# Patient Record
Sex: FEMALE | Race: WHITE | HISPANIC OR LATINO | Employment: UNEMPLOYED | URBAN - METROPOLITAN AREA
[De-identification: names, ages, dates, MRNs, and addresses within clinical notes are randomized per-mention and may not be internally consistent; named-entity substitution may affect disease eponyms.]

---

## 2021-04-29 ENCOUNTER — APPOINTMENT (EMERGENCY)
Dept: CT IMAGING | Facility: HOSPITAL | Age: 36
DRG: 720 | End: 2021-04-29
Payer: COMMERCIAL

## 2021-04-29 ENCOUNTER — HOSPITAL ENCOUNTER (INPATIENT)
Facility: HOSPITAL | Age: 36
LOS: 3 days | Discharge: HOME | DRG: 720 | End: 2021-05-02
Attending: EMERGENCY MEDICINE | Admitting: INTERNAL MEDICINE
Payer: COMMERCIAL

## 2021-04-29 DIAGNOSIS — N61.1 ABSCESS OF RIGHT BREAST: ICD-10-CM

## 2021-04-29 DIAGNOSIS — E11.65 UNCONTROLLED TYPE 2 DIABETES MELLITUS WITH HYPERGLYCEMIA (HCC): ICD-10-CM

## 2021-04-29 DIAGNOSIS — E11.9 DM (DIABETES MELLITUS) (HCC): ICD-10-CM

## 2021-04-29 DIAGNOSIS — N61.1 BREAST ABSCESS: Primary | ICD-10-CM

## 2021-04-29 PROBLEM — E87.1 HYPONATREMIA: Status: ACTIVE | Noted: 2021-04-29

## 2021-04-29 PROBLEM — K02.9 DENTAL CARIES: Status: ACTIVE | Noted: 2021-04-29

## 2021-04-29 PROBLEM — E66.01 MORBID OBESITY (HCC): Status: ACTIVE | Noted: 2021-04-29

## 2021-04-29 LAB
ALBUMIN SERPL BCP-MCNC: 3 G/DL (ref 3.5–5)
ALP SERPL-CCNC: 120 U/L (ref 46–116)
ALT SERPL W P-5'-P-CCNC: 15 U/L (ref 12–78)
ANION GAP SERPL CALCULATED.3IONS-SCNC: 8 MMOL/L (ref 4–13)
AST SERPL W P-5'-P-CCNC: 8 U/L (ref 5–45)
BASOPHILS # BLD AUTO: 0.03 THOUSANDS/ΜL (ref 0–0.1)
BASOPHILS NFR BLD AUTO: 0 % (ref 0–1)
BILIRUB SERPL-MCNC: 0.37 MG/DL (ref 0.2–1)
BUN SERPL-MCNC: 11 MG/DL (ref 5–25)
CALCIUM ALBUM COR SERPL-MCNC: 10.5 MG/DL (ref 8.3–10.1)
CALCIUM SERPL-MCNC: 9.7 MG/DL (ref 8.3–10.1)
CHLORIDE SERPL-SCNC: 96 MMOL/L (ref 100–108)
CO2 SERPL-SCNC: 26 MMOL/L (ref 21–32)
CREAT SERPL-MCNC: 1.24 MG/DL (ref 0.6–1.3)
EOSINOPHIL # BLD AUTO: 0.08 THOUSAND/ΜL (ref 0–0.61)
EOSINOPHIL NFR BLD AUTO: 1 % (ref 0–6)
ERYTHROCYTE [DISTWIDTH] IN BLOOD BY AUTOMATED COUNT: 13.3 % (ref 11.6–15.1)
EST. AVERAGE GLUCOSE BLD GHB EST-MCNC: 292 MG/DL
GFR SERPL CREATININE-BSD FRML MDRD: 56 ML/MIN/1.73SQ M
GLUCOSE SERPL-MCNC: 287 MG/DL (ref 65–140)
GLUCOSE SERPL-MCNC: 365 MG/DL (ref 65–140)
GLUCOSE SERPL-MCNC: 475 MG/DL (ref 65–140)
HBA1C MFR BLD: 11.8 %
HCT VFR BLD AUTO: 41.5 % (ref 34.8–46.1)
HGB BLD-MCNC: 13.3 G/DL (ref 11.5–15.4)
IMM GRANULOCYTES # BLD AUTO: 0.12 THOUSAND/UL (ref 0–0.2)
IMM GRANULOCYTES NFR BLD AUTO: 1 % (ref 0–2)
LYMPHOCYTES # BLD AUTO: 1.74 THOUSANDS/ΜL (ref 0.6–4.47)
LYMPHOCYTES NFR BLD AUTO: 14 % (ref 14–44)
MCH RBC QN AUTO: 27.1 PG (ref 26.8–34.3)
MCHC RBC AUTO-ENTMCNC: 32 G/DL (ref 31.4–37.4)
MCV RBC AUTO: 85 FL (ref 82–98)
MONOCYTES # BLD AUTO: 0.47 THOUSAND/ΜL (ref 0.17–1.22)
MONOCYTES NFR BLD AUTO: 4 % (ref 4–12)
NEUTROPHILS # BLD AUTO: 9.94 THOUSANDS/ΜL (ref 1.85–7.62)
NEUTS SEG NFR BLD AUTO: 80 % (ref 43–75)
NRBC BLD AUTO-RTO: 0 /100 WBCS
PLATELET # BLD AUTO: 352 THOUSANDS/UL (ref 149–390)
PMV BLD AUTO: 8.9 FL (ref 8.9–12.7)
POTASSIUM SERPL-SCNC: 4.4 MMOL/L (ref 3.5–5.3)
PROT SERPL-MCNC: 8.7 G/DL (ref 6.4–8.2)
RBC # BLD AUTO: 4.9 MILLION/UL (ref 3.81–5.12)
SODIUM SERPL-SCNC: 130 MMOL/L (ref 136–145)
WBC # BLD AUTO: 12.38 THOUSAND/UL (ref 4.31–10.16)

## 2021-04-29 PROCEDURE — 85025 COMPLETE CBC W/AUTO DIFF WBC: CPT | Performed by: EMERGENCY MEDICINE

## 2021-04-29 PROCEDURE — 36415 COLL VENOUS BLD VENIPUNCTURE: CPT | Performed by: EMERGENCY MEDICINE

## 2021-04-29 PROCEDURE — G1004 CDSM NDSC: HCPCS

## 2021-04-29 PROCEDURE — 87070 CULTURE OTHR SPECIMN AEROBIC: CPT | Performed by: PHYSICIAN ASSISTANT

## 2021-04-29 PROCEDURE — 99285 EMERGENCY DEPT VISIT HI MDM: CPT

## 2021-04-29 PROCEDURE — 96361 HYDRATE IV INFUSION ADD-ON: CPT

## 2021-04-29 PROCEDURE — 96374 THER/PROPH/DIAG INJ IV PUSH: CPT

## 2021-04-29 PROCEDURE — NC001 PR NO CHARGE: Performed by: PHYSICIAN ASSISTANT

## 2021-04-29 PROCEDURE — 71260 CT THORAX DX C+: CPT

## 2021-04-29 PROCEDURE — 82948 REAGENT STRIP/BLOOD GLUCOSE: CPT

## 2021-04-29 PROCEDURE — 83036 HEMOGLOBIN GLYCOSYLATED A1C: CPT | Performed by: PHYSICIAN ASSISTANT

## 2021-04-29 PROCEDURE — 87075 CULTR BACTERIA EXCEPT BLOOD: CPT | Performed by: PHYSICIAN ASSISTANT

## 2021-04-29 PROCEDURE — 99223 1ST HOSP IP/OBS HIGH 75: CPT | Performed by: INTERNAL MEDICINE

## 2021-04-29 PROCEDURE — 99284 EMERGENCY DEPT VISIT MOD MDM: CPT | Performed by: EMERGENCY MEDICINE

## 2021-04-29 PROCEDURE — 80053 COMPREHEN METABOLIC PANEL: CPT | Performed by: EMERGENCY MEDICINE

## 2021-04-29 PROCEDURE — 99254 IP/OBS CNSLTJ NEW/EST MOD 60: CPT | Performed by: PHYSICIAN ASSISTANT

## 2021-04-29 PROCEDURE — 0H95XZZ DRAINAGE OF CHEST SKIN, EXTERNAL APPROACH: ICD-10-PCS | Performed by: EMERGENCY MEDICINE

## 2021-04-29 PROCEDURE — 87186 SC STD MICRODIL/AGAR DIL: CPT | Performed by: PHYSICIAN ASSISTANT

## 2021-04-29 PROCEDURE — 10061 I&D ABSCESS COMP/MULTIPLE: CPT | Performed by: PHYSICIAN ASSISTANT

## 2021-04-29 RX ORDER — ONDANSETRON 2 MG/ML
4 INJECTION INTRAMUSCULAR; INTRAVENOUS EVERY 4 HOURS PRN
Status: DISCONTINUED | OUTPATIENT
Start: 2021-04-29 | End: 2021-05-03 | Stop reason: HOSPADM

## 2021-04-29 RX ORDER — MORPHINE SULFATE 10 MG/ML
6 INJECTION, SOLUTION INTRAMUSCULAR; INTRAVENOUS ONCE
Status: COMPLETED | OUTPATIENT
Start: 2021-04-29 | End: 2021-04-29

## 2021-04-29 RX ORDER — SODIUM CHLORIDE 9 MG/ML
125 INJECTION, SOLUTION INTRAVENOUS CONTINUOUS
Status: DISPENSED | OUTPATIENT
Start: 2021-04-29 | End: 2021-04-30

## 2021-04-29 RX ORDER — LIDOCAINE HYDROCHLORIDE 10 MG/ML
10 INJECTION, SOLUTION EPIDURAL; INFILTRATION; INTRACAUDAL; PERINEURAL ONCE
Status: COMPLETED | OUTPATIENT
Start: 2021-04-29 | End: 2021-04-29

## 2021-04-29 RX ORDER — OXYCODONE HYDROCHLORIDE 5 MG/1
5 TABLET ORAL EVERY 4 HOURS PRN
Status: DISCONTINUED | OUTPATIENT
Start: 2021-04-29 | End: 2021-05-03 | Stop reason: HOSPADM

## 2021-04-29 RX ORDER — ACETAMINOPHEN 325 MG/1
650 TABLET ORAL EVERY 6 HOURS PRN
Status: DISCONTINUED | OUTPATIENT
Start: 2021-04-29 | End: 2021-05-03 | Stop reason: HOSPADM

## 2021-04-29 RX ADMIN — LIDOCAINE HYDROCHLORIDE 10 ML: 10 INJECTION, SOLUTION EPIDURAL; INFILTRATION; INTRACAUDAL; PERINEURAL at 14:52

## 2021-04-29 RX ADMIN — SODIUM CHLORIDE 125 ML/HR: 0.9 INJECTION, SOLUTION INTRAVENOUS at 18:29

## 2021-04-29 RX ADMIN — INSULIN HUMAN 10 UNITS: 100 INJECTION, SOLUTION PARENTERAL at 18:41

## 2021-04-29 RX ADMIN — INSULIN LISPRO 6 UNITS: 100 INJECTION, SOLUTION INTRAVENOUS; SUBCUTANEOUS at 21:46

## 2021-04-29 RX ADMIN — INSULIN DETEMIR 25 UNITS: 100 INJECTION, SOLUTION SUBCUTANEOUS at 22:12

## 2021-04-29 RX ADMIN — PIPERACILLIN AND TAZOBACTAM 3.38 G: 36; 4.5 INJECTION, POWDER, FOR SOLUTION INTRAVENOUS at 16:16

## 2021-04-29 RX ADMIN — SODIUM CHLORIDE 1000 ML: 0.9 INJECTION, SOLUTION INTRAVENOUS at 13:46

## 2021-04-29 RX ADMIN — ENOXAPARIN SODIUM 40 MG: 40 INJECTION SUBCUTANEOUS at 18:29

## 2021-04-29 RX ADMIN — IOHEXOL 100 ML: 350 INJECTION, SOLUTION INTRAVENOUS at 14:31

## 2021-04-29 RX ADMIN — MORPHINE SULFATE 6 MG: 10 INJECTION INTRAVENOUS at 13:46

## 2021-04-29 NOTE — PROCEDURES
Incision and drain    Date/Time: 4/29/2021 6:06 PM  Performed by: Branden Leyva PA-C  Authorized by: Branden Leyva PA-C   Universal Protocol:  Consent: Verbal consent obtained  Risks and benefits: risks, benefits and alternatives were discussed  Consent given by: patient and parent  Time out: Immediately prior to procedure a "time out" was called to verify the correct patient, procedure, equipment, support staff and site/side marked as required  Timeout called at: 4/29/2021 3:15 PM   Patient identity confirmed: verbally with patient      Patient location:  ED  Location:     Type:  Abscess    Location:  Trunk  Anesthesia (see MAR for exact dosages): Anesthesia method:  Local infiltration    Local anesthetic:  Lidocaine 1% w/o epi  Procedure details:     Complexity:  Complex    Needle aspiration: no      Incision types:  Elliptical    Scalpel blade:  11    Approach:  Open    Incision depth:  Dermal    Wound management:  Probed and deloculated    Drainage:  Purulent    Drainage amount:  Copious    Wound treatment:  Packing placed    Packing materials:  1/2 in gauze    Amount 1/2":  4 inches   Post-procedure details:     Patient tolerance of procedure: Tolerated well, no immediate complications    Complication (if applicable):  Cultures obtained

## 2021-04-29 NOTE — ASSESSMENT & PLAN NOTE
· Breast abscess in the setting of hyperglycemia/uncontrolled diabetes  · Status post I&D by surgery in the ED today    Send culture stat and start unasyn; tailor antibiotics based on culture data

## 2021-04-29 NOTE — ED PROVIDER NOTES
History  Chief Complaint   Patient presents with    Breast Problem     Patient c/o right breast redness and swelling, believes she has an abscess  Patient is a 75-year-old female past medical history of diabetes noncompliant with her medication presenting for right breast pain  Patient states the last 2-3 days she has had worsened swelling, pain which is stabbing in nature, intermittent, worse with pressure nonradiating to the right breast   She denies any drainage from the swelling, nipple drainage, fevers  She states she took Tylenol yesterday with no relief as well as trazodone yesterday with no relief, no medications today  She also has been trying hot compresses, once a day for the last 2-3 days with no relief  None       Past Medical History:   Diagnosis Date    [facility]        History reviewed  No pertinent surgical history  History reviewed  No pertinent family history  I have reviewed and agree with the history as documented  E-Cigarette/Vaping     E-Cigarette/Vaping Substances     Social History     Tobacco Use    Smoking status: Never Smoker    Smokeless tobacco: Never Used   Substance Use Topics    Alcohol use: Never     Frequency: Never    Drug use: Never       Review of Systems   All other systems reviewed and are negative  Physical Exam  Physical Exam  Vitals signs reviewed  Constitutional:       General: She is not in acute distress  Appearance: Normal appearance  She is not ill-appearing  HENT:      Mouth/Throat:      Mouth: Mucous membranes are moist    Eyes:      Conjunctiva/sclera: Conjunctivae normal    Neck:      Musculoskeletal: Neck supple  Cardiovascular:      Rate and Rhythm: Normal rate and regular rhythm  Heart sounds: Normal heart sounds  Pulmonary:      Effort: Pulmonary effort is normal       Breath sounds: Normal breath sounds        Comments: Patient has large roughly 8-9 cm in diameter very indurated erythematous collection with central head but no active drainage to the right breast, no nipple drainage, tender to palpation, little to no fluctuance appreciated  Abdominal:      General: Abdomen is flat  Palpations: Abdomen is soft  Tenderness: There is no abdominal tenderness  Musculoskeletal: Normal range of motion  General: No swelling  Skin:     General: Skin is warm and dry  Neurological:      General: No focal deficit present  Mental Status: She is alert     Psychiatric:         Mood and Affect: Mood normal          Vital Signs  ED Triage Vitals   Temperature Pulse Respirations Blood Pressure SpO2   04/29/21 1247 04/29/21 1247 04/29/21 1247 04/29/21 1247 04/29/21 1247   98 9 °F (37 2 °C) (!) 106 18 127/88 98 %      Temp src Heart Rate Source Patient Position - Orthostatic VS BP Location FiO2 (%)   -- 04/29/21 1247 04/29/21 1413 04/29/21 1413 --    Monitor Sitting Right arm       Pain Score       04/29/21 1413       No Pain           Vitals:    04/29/21 1247 04/29/21 1413 04/29/21 1818   BP: 127/88 143/88 136/98   Pulse: (!) 106 (!) 111 (!) 124   Patient Position - Orthostatic VS:  Sitting          Visual Acuity      ED Medications  Medications   sodium chloride 0 9 % infusion (125 mL/hr Intravenous New Bag 4/29/21 1829)   insulin lispro (HumaLOG) 100 units/mL subcutaneous injection 2-12 Units (has no administration in time range)   insulin detemir (LEVEMIR) subcutaneous injection 25 Units (has no administration in time range)   enoxaparin (LOVENOX) subcutaneous injection 40 mg (40 mg Subcutaneous Given 4/29/21 1829)   acetaminophen (TYLENOL) tablet 650 mg (has no administration in time range)   ondansetron (ZOFRAN) injection 4 mg (has no administration in time range)   oxyCODONE (ROXICODONE) IR tablet 5 mg (has no administration in time range)   insulin lispro (HumaLOG) 100 units/mL subcutaneous injection 8 Units (has no administration in time range)   ampicillin-sulbactam (UNASYN) 3 g in sodium chloride 0 9 % 100 mL IVPB (has no administration in time range)   morphine (PF) 10 mg/mL injection 6 mg (6 mg Intravenous Given 4/29/21 1346)   sodium chloride 0 9 % bolus 1,000 mL (0 mL Intravenous Stopped 4/29/21 1719)   iohexol (OMNIPAQUE) 350 MG/ML injection (MULTI-DOSE) 100 mL (100 mL Intravenous Given 4/29/21 1431)   lidocaine (PF) (XYLOCAINE-MPF) 1 % injection 10 mL (10 mL Infiltration Given by Other 4/29/21 1452)   piperacillin-tazobactam (ZOSYN) 3 375 g in sodium chloride 0 9 % 100 mL IVPB (0 g Intravenous Stopped 4/29/21 1719)   insulin regular (HumuLIN R,NovoLIN R) injection 10 Units (10 Units Intravenous Given 4/29/21 1841)       Diagnostic Studies  Results Reviewed     Procedure Component Value Units Date/Time    Hemoglobin A1C [[id number]]  (Abnormal) Collected: 04/29/21 1346    Lab Status: Final result Specimen: Blood from Arm, Left Updated: 04/29/21 2013     Hemoglobin A1C 11 8 %       mg/dl     Anaerobic culture and Gram stain [[id number]] Collected: 04/29/21 1907    Lab Status: In process Specimen: Body Fluid from Other Updated: 04/29/21 1930    Wound culture and Gram stain [[id number]] Collected: 04/29/21 1907    Lab Status: In process Specimen: Wound from Breast, Right Updated: 04/29/21 1930    Body fluid culture and Gram stain [[id number]] Collected: 04/29/21 1908    Lab Status: In process Specimen:  Body Fluid from Other Updated: 04/29/21 1930    POCT pregnancy, urine [[id number]]     Lab Status: No result     Comprehensive metabolic panel [[id number]]  (Abnormal) Collected: 04/29/21 1346    Lab Status: Final result Specimen: Blood from Arm, Left Updated: 04/29/21 1411     Sodium 130 mmol/L      Potassium 4 4 mmol/L      Chloride 96 mmol/L      CO2 26 mmol/L      ANION GAP 8 mmol/L      BUN 11 mg/dL      Creatinine 1 24 mg/dL      Glucose 475 mg/dL      Calcium 9 7 mg/dL      Corrected Calcium 10 5 mg/dL      AST 8 U/L      ALT 15 U/L      Alkaline Phosphatase 120 U/L      Total Protein 8 7 g/dL      Albumin 3 0 g/dL      Total Bilirubin 0 37 mg/dL      eGFR 56 ml/min/1 73sq m     Narrative:      [place] guidelines for Chronic Kidney Disease (CKD):     Stage 1 with normal or high GFR (GFR > 90 mL/min/1 73 square meters)    Stage 2 Mild CKD (GFR = 60-89 mL/min/1 73 square meters)    Stage 3A Moderate CKD (GFR = 45-59 mL/min/1 73 square meters)    Stage 3B Moderate CKD (GFR = 30-44 mL/min/1 73 square meters)    Stage 4 Severe CKD (GFR = 15-29 mL/min/1 73 square meters)    Stage 5 End Stage CKD (GFR <15 mL/min/1 73 square meters)  Note: GFR calculation is accurate only with a steady state creatinine    CBC and differential [[id number]]  (Abnormal) Collected: 04/29/21 1346    Lab Status: Final result Specimen: Blood from Arm, Left Updated: 04/29/21 1356     WBC 12 38 Thousand/uL      RBC 4 90 Million/uL      Hemoglobin 13 3 g/dL      Hematocrit 41 5 %      MCV 85 fL      MCH 27 1 pg      MCHC 32 0 g/dL      RDW 13 3 %      MPV 8 9 fL      Platelets 147 Thousands/uL      nRBC 0 /100 WBCs      Neutrophils Relative 80 %      Immat GRANS % 1 %      Lymphocytes Relative 14 %      Monocytes Relative 4 %      Eosinophils Relative 1 %      Basophils Relative 0 %      Neutrophils Absolute 9 94 Thousands/µL      Immature Grans Absolute 0 12 Thousand/uL      Lymphocytes Absolute 1 74 Thousands/µL      Monocytes Absolute 0 47 Thousand/µL      Eosinophils Absolute 0 08 Thousand/µL      Basophils Absolute 0 03 Thousands/µL                  CT chest with contrast   Final Result by Jose Martin Saunders MD (04/29 6452)      Right breast cellulitis with superficial 7 3 cm abscess  Incidental findings of fatty liver and cholelithiasis               I personally discussed this study with Steve Burns on 4/29/2021 at 3:00 PM                   Workstation performed: [id number]                    Procedures  Procedures         ED Course  ED Course as of Apr 29 2116   Thu Apr 29, 2021 1513 Patient with 7 cm abscess and surrounding cellulitis, awaiting surgical consult  [id number] Surgery has performed I&D and recommends admission IV antibiotics  SBIRT 20yo+      Most Recent Value   SBIRT (22 yo +)   In order to provide better care to our patients, we are screening all of our patients for alcohol and drug use  Would it be okay to ask you these screening questions? Yes Filed at: 04/29/2021 1321   Initial Alcohol Screen: US AUDIT-C    1  How often do you have a drink containing alcohol?  0 Filed at: 04/29/2021 1321   2  How many drinks containing alcohol do you have on a typical day you are drinking? 0 Filed at: 04/29/2021 1321   3b  FEMALE Any Age, or MALE 65+: How often do you have 4 or more drinks on one occassion? 0 Filed at: 04/29/2021 1321   Audit-C Score  0 Filed at: 04/29/2021 1321   ARIEL: How many times in the past year have you    Used an illegal drug or used a prescription medication for non-medical reasons? Never Filed at: 04/29/2021 1321                    [facility]  Number of Diagnoses or Management Options  Diagnosis management comments: Patient is a 38-year-old female presenting with breast abscess  Patient is well-appearing bedside stable vitals and in no acute distress  She is very large indurated erythematous breast abscess with no drainage from central head  Will obtain labs and CT as breast ultrasonography is not available and discuss with general surgery versus breast surgery for drainage        Disposition  Final diagnoses:   Breast abscess     Time reflects when diagnosis was documented in both MDM as applicable and the Disposition within this note     Time User Action Codes Description Comment    4/29/2021  4:05 PM Loni Alfaro Add [N61 1] Breast abscess     4/29/2021  6:14 PM Linda VILLAR Add [N61 1] Abscess of right breast       ED Disposition     ED Disposition Condition Date/Time Comment    Admit Stable Thu Apr 29, 2021  4:05 PM Case was discussed with Dr Julianna Barbosa and the patient's admission status was agreed to be Admission Status: inpatient status to the service of Dr Julianna Barbosa   Follow-up Information    None         There are no discharge medications for this patient  No discharge procedures on file      PDMP Review     None          ED Provider  Electronically Signed by           Sonal Herron DO  04/29/21 6401

## 2021-04-29 NOTE — ASSESSMENT & PLAN NOTE
No results found for: HGBA1C    No results for input(s): POCGLU in the last 72 hours  · Previously on oral medications years ago but has been lost to follow-up  · Follow-up on A1c    Give 10 units IV insulin x1 and start levemir with sliding scale insulin

## 2021-04-29 NOTE — ASSESSMENT & PLAN NOTE
· Very poor dentition with risk for dental abscess given hyperglycemia  · Advised follow-up with a dentist after discharge
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
## 2021-04-29 NOTE — PLAN OF CARE
Problem: PAIN - ADULT  Goal: Verbalizes/displays adequate comfort level or baseline comfort level  Description: Interventions:  - Encourage patient to monitor pain and request assistance  - Assess pain using appropriate pain scale  - Administer analgesics based on type and severity of pain and evaluate response  - Implement non-pharmacological measures as appropriate and evaluate response  - Consider cultural and social influences on pain and pain management  - Notify physician/advanced practitioner if interventions unsuccessful or patient reports new pain  Outcome: Progressing     Problem: INFECTION - ADULT  Goal: Absence or prevention of progression during hospitalization  Description: INTERVENTIONS:  - Assess and monitor for signs and symptoms of infection  - Monitor lab/diagnostic results  - Monitor all insertion sites, i e  indwelling lines, tubes, and drains  - Monitor endotracheal if appropriate and nasal secretions for changes in amount and color  - [place] appropriate cooling/warming therapies per order  - Administer medications as ordered  - Instruct and encourage patient and family to use good hand hygiene technique  - Identify and instruct in appropriate isolation precautions for identified infection/condition  Outcome: Progressing  Goal: Absence of fever/infection during neutropenic period  Description: INTERVENTIONS:  - Monitor WBC    Outcome: Progressing     Problem: SAFETY ADULT  Goal: Patient will remain free of falls  Description: INTERVENTIONS:  - Assess patient frequently for physical needs  -  Identify cognitive and physical deficits and behaviors that affect risk of falls    -  [place] fall precautions as indicated by assessment   - Educate patient/family on patient safety including physical limitations  - Instruct patient to call for assistance with activity based on assessment  - Modify environment to reduce risk of injury  - Consider OT/PT consult to assist with strengthening/mobility  Outcome: Progressing  Goal: Maintain or return to baseline ADL function  Description: INTERVENTIONS:  -  Assess patient's ability to carry out ADLs; assess patient's baseline for ADL function and identify physical deficits which impact ability to perform ADLs (bathing, care of mouth/teeth, toileting, grooming, dressing, etc )  - Assess/evaluate cause of self-care deficits   - Assess range of motion  - Assess patient's mobility; develop plan if impaired  - Assess patient's need for assistive devices and provide as appropriate  - Encourage maximum independence but intervene and supervise when necessary  - Involve family in performance of ADLs  - Assess for home care needs following discharge   - Consider OT consult to assist with ADL evaluation and planning for discharge  - Provide patient education as appropriate  Outcome: Progressing  Goal: Maintain or return mobility status to optimal level  Description: INTERVENTIONS:  - Assess patient's baseline mobility status (ambulation, transfers, stairs, etc )    - Identify cognitive and physical deficits and behaviors that affect mobility  - Identify mobility aids required to assist with transfers and/or ambulation (gait belt, sit-to-stand, lift, walker, cane, etc )  - [place] fall precautions as indicated by assessment  - Record patient progress and toleration of activity level on Mobility SBAR; progress patient to next Phase/Stage  - Instruct patient to call for assistance with activity based on assessment  - Consider rehabilitation consult to assist with strengthening/weightbearing, etc   Outcome: Progressing     Problem: DISCHARGE PLANNING  Goal: Discharge to home or other facility with appropriate resources  Description: INTERVENTIONS:  - Identify barriers to discharge w/patient and caregiver  - Arrange for needed discharge resources and transportation as appropriate  - Identify discharge learning needs (meds, wound care, etc )  - Arrange for interpretive services to assist at discharge as needed  - Refer to Case Management Department for coordinating discharge planning if the patient needs post-hospital services based on physician/advanced practitioner order or complex needs related to functional status, cognitive ability, or social support system  Outcome: Progressing     Problem: Knowledge Deficit  Goal: Patient/family/caregiver demonstrates understanding of disease process, treatment plan, medications, and discharge instructions  Description: Complete learning assessment and assess knowledge base  Interventions:  - Provide teaching at level of understanding  - Provide teaching via preferred learning methods  Outcome: Progressing     Problem: Nutrition/Hydration-ADULT  Goal: Nutrient/Hydration intake appropriate for improving, restoring or maintaining nutritional needs  Description: Monitor and assess patient's nutrition/hydration status for malnutrition  Collaborate with interdisciplinary team and initiate plan and interventions as ordered  Monitor patient's weight and dietary intake as ordered or per policy  Utilize nutrition screening tool and intervene as necessary  Determine patient's food preferences and provide high-protein, high-caloric foods as appropriate       INTERVENTIONS:  - Monitor oral intake, urinary output, labs, and treatment plans  - Assess nutrition and hydration status and recommend course of action  - Evaluate amount of meals eaten  - Assist patient with eating if necessary   - Allow adequate time for meals  - Recommend/ encourage appropriate diets, oral nutritional supplements, and vitamin/mineral supplements  - Order, calculate, and assess calorie counts as needed  - Recommend, monitor, and adjust tube feedings and TPN/PPN based on assessed needs  - Assess need for intravenous fluids  - Provide specific nutrition/hydration education as appropriate  - Include patient/family/caregiver in decisions related to nutrition  Outcome: Progressing
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
## 2021-04-29 NOTE — ASSESSMENT & PLAN NOTE
· Secondary to hyperglycemia  · Continue NSS and recheck in a m      Results from last 7 days   Lab Units 04/29/21  1346   SODIUM mmol/L 130*

## 2021-04-30 LAB
ALBUMIN SERPL BCP-MCNC: 2.3 G/DL (ref 3.5–5)
ALP SERPL-CCNC: 90 U/L (ref 46–116)
ALT SERPL W P-5'-P-CCNC: 10 U/L (ref 12–78)
ANION GAP SERPL CALCULATED.3IONS-SCNC: 7 MMOL/L (ref 4–13)
AST SERPL W P-5'-P-CCNC: 8 U/L (ref 5–45)
BILIRUB SERPL-MCNC: 0.26 MG/DL (ref 0.2–1)
BUN SERPL-MCNC: 9 MG/DL (ref 5–25)
CALCIUM ALBUM COR SERPL-MCNC: 10 MG/DL (ref 8.3–10.1)
CALCIUM SERPL-MCNC: 8.6 MG/DL (ref 8.3–10.1)
CHLORIDE SERPL-SCNC: 101 MMOL/L (ref 100–108)
CO2 SERPL-SCNC: 23 MMOL/L (ref 21–32)
CREAT SERPL-MCNC: 0.92 MG/DL (ref 0.6–1.3)
ERYTHROCYTE [DISTWIDTH] IN BLOOD BY AUTOMATED COUNT: 13.3 % (ref 11.6–15.1)
GFR SERPL CREATININE-BSD FRML MDRD: 80 ML/MIN/1.73SQ M
GLUCOSE SERPL-MCNC: 192 MG/DL (ref 65–140)
GLUCOSE SERPL-MCNC: 229 MG/DL (ref 65–140)
GLUCOSE SERPL-MCNC: 242 MG/DL (ref 65–140)
GLUCOSE SERPL-MCNC: 243 MG/DL (ref 65–140)
GLUCOSE SERPL-MCNC: 260 MG/DL (ref 65–140)
GLUCOSE SERPL-MCNC: 296 MG/DL (ref 65–140)
HCT VFR BLD AUTO: 35.2 % (ref 34.8–46.1)
HGB BLD-MCNC: 11.3 G/DL (ref 11.5–15.4)
MCH RBC QN AUTO: 27.2 PG (ref 26.8–34.3)
MCHC RBC AUTO-ENTMCNC: 32.1 G/DL (ref 31.4–37.4)
MCV RBC AUTO: 85 FL (ref 82–98)
PLATELET # BLD AUTO: 277 THOUSANDS/UL (ref 149–390)
PMV BLD AUTO: 8.8 FL (ref 8.9–12.7)
POTASSIUM SERPL-SCNC: 4 MMOL/L (ref 3.5–5.3)
PROT SERPL-MCNC: 7 G/DL (ref 6.4–8.2)
RBC # BLD AUTO: 4.15 MILLION/UL (ref 3.81–5.12)
SODIUM SERPL-SCNC: 131 MMOL/L (ref 136–145)
WBC # BLD AUTO: 11.74 THOUSAND/UL (ref 4.31–10.16)

## 2021-04-30 PROCEDURE — 82948 REAGENT STRIP/BLOOD GLUCOSE: CPT

## 2021-04-30 PROCEDURE — 99024 POSTOP FOLLOW-UP VISIT: CPT | Performed by: SURGERY

## 2021-04-30 PROCEDURE — 99232 SBSQ HOSP IP/OBS MODERATE 35: CPT | Performed by: NURSE PRACTITIONER

## 2021-04-30 PROCEDURE — 80053 COMPREHEN METABOLIC PANEL: CPT | Performed by: INTERNAL MEDICINE

## 2021-04-30 PROCEDURE — 85027 COMPLETE CBC AUTOMATED: CPT | Performed by: INTERNAL MEDICINE

## 2021-04-30 RX ADMIN — INSULIN LISPRO 8 UNITS: 100 INJECTION, SOLUTION INTRAVENOUS; SUBCUTANEOUS at 11:22

## 2021-04-30 RX ADMIN — INSULIN LISPRO 8 UNITS: 100 INJECTION, SOLUTION INTRAVENOUS; SUBCUTANEOUS at 09:14

## 2021-04-30 RX ADMIN — INSULIN LISPRO 6 UNITS: 100 INJECTION, SOLUTION INTRAVENOUS; SUBCUTANEOUS at 11:21

## 2021-04-30 RX ADMIN — INSULIN LISPRO 2 UNITS: 100 INJECTION, SOLUTION INTRAVENOUS; SUBCUTANEOUS at 17:00

## 2021-04-30 RX ADMIN — INSULIN DETEMIR 25 UNITS: 100 INJECTION, SOLUTION SUBCUTANEOUS at 21:14

## 2021-04-30 RX ADMIN — AMPICILLIN SODIUM AND SULBACTAM SODIUM 3 G: 10; 5 INJECTION, POWDER, FOR SOLUTION INTRAVENOUS at 11:53

## 2021-04-30 RX ADMIN — INSULIN LISPRO 4 UNITS: 100 INJECTION, SOLUTION INTRAVENOUS; SUBCUTANEOUS at 09:13

## 2021-04-30 RX ADMIN — INSULIN LISPRO 8 UNITS: 100 INJECTION, SOLUTION INTRAVENOUS; SUBCUTANEOUS at 17:00

## 2021-04-30 RX ADMIN — AMPICILLIN SODIUM AND SULBACTAM SODIUM 3 G: 10; 5 INJECTION, POWDER, FOR SOLUTION INTRAVENOUS at 00:05

## 2021-04-30 RX ADMIN — INSULIN LISPRO 4 UNITS: 100 INJECTION, SOLUTION INTRAVENOUS; SUBCUTANEOUS at 21:14

## 2021-04-30 RX ADMIN — AMPICILLIN SODIUM AND SULBACTAM SODIUM 3 G: 10; 5 INJECTION, POWDER, FOR SOLUTION INTRAVENOUS at 18:07

## 2021-04-30 RX ADMIN — AMPICILLIN SODIUM AND SULBACTAM SODIUM 3 G: 10; 5 INJECTION, POWDER, FOR SOLUTION INTRAVENOUS at 23:46

## 2021-04-30 RX ADMIN — ENOXAPARIN SODIUM 40 MG: 40 INJECTION SUBCUTANEOUS at 09:12

## 2021-04-30 RX ADMIN — AMPICILLIN SODIUM AND SULBACTAM SODIUM 3 G: 10; 5 INJECTION, POWDER, FOR SOLUTION INTRAVENOUS at 06:04

## 2021-04-30 RX ADMIN — SODIUM CHLORIDE 125 ML/HR: 0.9 INJECTION, SOLUTION INTRAVENOUS at 03:28

## 2021-04-30 NOTE — ASSESSMENT & PLAN NOTE
Lab Results   Component Value Date    HGBA1C 11 8 (H) 04/29/2021       Recent Labs     04/29/21  1821 04/29/21  2043 04/30/21  0744   POCGLU 365* 287* 242*       · Previously on oral medications years ago but has been lost to follow-up  · The A1c 11 8  · Started on 25 units of Levemir nightly, received last night blood sugar still 240 this morning will consider increasing to 30 units tonight   · 8 units of Humalog with meals plus sliding scale  · Case management aware will need insulin and glucometer on discharge as well as endocrinology follow-up

## 2021-04-30 NOTE — CASE MANAGEMENT
[facility] unable to service due to census staffing  Referral sent to 40 mile radius await intake referral    [facility]  Discussed Freedom of Choice with patient  List of agencies given to patient via in person  patient aware the list is custom filtered for them by preference  and that [facility] post acute providers are designated

## 2021-04-30 NOTE — UTILIZATION REVIEW
Initial Clinical Review    Admission: Date/Time/Statement:   Admission Orders (From admission, onward)     Ordered        04/29/21 1606  Inpatient Admission  Once                   Orders Placed This Encounter   Procedures    Inpatient Admission     Standing Status:   Standing     Number of Occurrences:   1     Order Specific Question:   Level of Care     Answer:   Med Surg [16]     Order Specific Question:   Estimated length of stay     Answer:   More than 2 Midnights     Order Specific Question:   Certification     Answer:   I certify that inpatient services are medically necessary for this patient for a duration of greater than two midnights  See H&P and MD Progress Notes for additional information about the patient's course of treatment  ED Arrival Information     Expected Arrival Acuity Means of Arrival Escorted By Service Admission Type    - 4/29/2021 12:39 Less Urgent Walk-In Family Member General Medicine Urgent    Arrival Complaint    Abscess        Chief Complaint   Patient presents with    Breast Problem     Patient c/o right breast redness and swelling, believes she has an abscess  Initial Presentation: 38 y o  female with a past medical history of uncontrolled diabetes, hasn't taken metformin ordered x several yrs who presents to ED from home with worsening right breast pain and swelling over last three days  Imaging showed cellulitis and abscess R breast Labs show leukocytosis and elevated glucose, low sodium  On exam, pt tachycardic, has roughly 8-9 cm in diameter very indurated, erythematous collection with central head but no active drainage to the right breast, no nipple drainage, tender to palpation, little to no fluctuance appreciated  No relief from home Tylenol, trazadone or hot compresses  Pt has dental caries   Pt underwent I and D in ED by general surgery under local with cultures obtained  Wound probed and deloculated for copious purulent drainage   Wound packed with gauze and drsg applied  Pt admitted as Inpatient with abscess R breast and uncontrolled DM with hyperglycemia  Plan includes IV Zosyn, daily wound care packing, warm compresses, monitor labs, give 10 U IV insulinx1  And start Levemir with SSI and accucheks, IVF -NSS    Date: 4/30/21 Day 2:   General surgery- Cellulitis- large area of cellulitic erythema along lateral border slightly improved compared to marked margins, 3cm incision still with mild amount of purulent drainage, no further palpable areas of fluctuance    Plan to continue daily wound care and IV abx, f/u wound cultures to tailor abx, Aqua K pad over cellulitis  Medicine-OagE0k=05 8  Blood sugar still elevated today, consider increasing Levemir to 30 U at night, 8 U humalog with meals and SSI  Hyponatremia improving with IVF,will continue to improve with better glycemic control  WBC downtrending    ED Triage Vitals   Temperature Pulse Respirations Blood Pressure SpO2   04/29/21 1247 04/29/21 1247 04/29/21 1247 04/29/21 1247 04/29/21 1247   98 9 °F (37 2 °C) (!) 106 18 127/88 98 %      Temp Source Heart Rate Source Patient Position - Orthostatic VS BP Location FiO2 (%)   04/30/21 0749 04/29/21 1247 04/29/21 1413 04/29/21 1413 --   Oral Monitor Sitting Right arm       Pain Score       04/29/21 1413       No Pain          Wt Readings from Last 1 Encounters:   04/29/21 99 3 kg (219 lb)     Additional Vital Signs:   Date/Time  Temp  Pulse  Resp  BP  MAP (mmHg)  SpO2    04/30/21 14:51:13  99 4 °F (37 4 °C)  106Abnormal   17  123/80  94  97 %    04/30/21 1153  --  --  --  --  --  --    04/30/21 07:49:10  98 6 °F (37 °C)  98  17  112/76  88  96 %    04/29/21 23:25:17  98 9 °F (37 2 °C)  100  17  109/73  85  95 %    04/29/21 18:18:26  98 6 °F (37 °C)  124Abnormal   18  136/98  111  96 %    04/29/21 1413  --  111Abnormal   17  143/88  --  96 %        Pertinent Labs/Diagnostic Test Results:   4/29 CT chest-Right breast cellulitis with superficial 7 3 cm abscess    Incidental findings of fatty liver and cholelithiasis        Results from last 7 days   Lab Units 04/30/21  0548 04/29/21  1346   WBC Thousand/uL 11 74* 12 38*   HEMOGLOBIN g/dL 11 3* 13 3   HEMATOCRIT % 35 2 41 5   PLATELETS Thousands/uL 277 352   NEUTROS ABS Thousands/µL  --  9 94*         Results from last 7 days   Lab Units 04/30/21  0548 04/29/21  1346   SODIUM mmol/L 131* 130*   POTASSIUM mmol/L 4 0 4 4   CHLORIDE mmol/L 101 96*   CO2 mmol/L 23 26   ANION GAP mmol/L 7 8   BUN mg/dL 9 11   CREATININE mg/dL 0 92 1 24   EGFR ml/min/1 73sq m 80 56   CALCIUM mg/dL 8 6 9 7     Results from last 7 days   Lab Units 04/30/21  0548 04/29/21  1346   AST U/L 8 8   ALT U/L 10* 15   ALK PHOS U/L 90 120*   TOTAL PROTEIN g/dL 7 0 8 7*   ALBUMIN g/dL 2 3* 3 0*   TOTAL BILIRUBIN mg/dL 0 26 0 37     Results from last 7 days   Lab Units 04/30/21  1212 04/30/21  0744 04/29/21  2043 04/29/21  1821   POC GLUCOSE mg/dl 243* 242* 287* 365*     Results from last 7 days   Lab Units 04/30/21  0548 04/29/21  1346   GLUCOSE RANDOM mg/dL 260* 475*         Results from last 7 days   Lab Units 04/29/21  1346   HEMOGLOBIN A1C % 11 8*   EAG mg/dl 292           Results from last 7 days   Lab Units 04/29/21  1908   BODY FLUID CULTURE, STERILE  Culture too young- will reincubate               ED Treatment:   Medication Administration from 04/29/2021 1239 to 04/29/2021 1809       Date/Time Order Dose Route Action     04/29/2021 1346 morphine (PF) 10 mg/mL injection 6 mg 6 mg Intravenous Given     04/29/2021 1346 sodium chloride 0 9 % bolus 1,000 mL 1,000 mL Intravenous New Bag     04/29/2021 1431 iohexol (OMNIPAQUE) 350 MG/ML injection (MULTI-DOSE) 100 mL 100 mL Intravenous Given     04/29/2021 1452 lidocaine (PF) (XYLOCAINE-MPF) 1 % injection 10 mL 10 mL Infiltration Given by Other     04/29/2021 1616 piperacillin-tazobactam (ZOSYN) 3 375 g in sodium chloride 0 9 % 100 mL IVPB 3 375 g Intravenous New Bag        Past Medical History:   Diagnosis Date    DM [facility]diabetes mellitus) ([facility] )      Present on Admission:   Abscess of right breast   Uncontrolled type 2 diabetes mellitus with hyperglycemia (HCC)   Dental caries   Morbid obesity ([facility] )   Hyponatremia      Admitting Diagnosis: Breast pain [N64 4]  Breast abscess [N61 1]  Age/Sex: 38 y o  female  Admission Orders:  Scheduled Medications:  ampicillin-sulbactam, 3 g, Intravenous, Q6H  enoxaparin, 40 mg, Subcutaneous, Q24H EMY  insulin detemir, 25 Units, Subcutaneous, HS  insulin lispro, 2-12 Units, Subcutaneous, 4x Daily (AC & HS)  insulin lispro, 8 Units, Subcutaneous, TID With Meals      Continuous IV Infusions:sodium chloride 0 9 % infusion   Rate: 125 mL/hr Dose: 125 mL/hr  Freq: Continuous Route: IV  Indications of Use: IV Hydration  Last Dose: Stopped (04/30/21 1514)     PRN Meds:  acetaminophen, 650 mg, Oral, Q6H PRN  ondansetron, 4 mg, Intravenous, Q4H PRN  oxyCODONE, 5 mg, Oral, Q4H PRN    daily wound care- anterior R breast-saline flush, 1 inch ribbon, dry drsg  poct glucose QID  Level 2 carb diet  IP CONSULT TO ACUTE CARE SURGERY    Network Utilization Review Department  ATTENTION: Please call with any questions or concerns to [phone] and carefully listen to the prompts so that you are directed to the right person  All voicemails are confidential   [facility] all requests for admission clinical reviews, approved or denied determinations and any other requests to dedicated fax number below belonging to the campus where the patient is receiving treatment   List of dedicated fax numbers for the Facilities:  [address] DENIALS (Administrative/Medical Necessity) [phone]   [address] (Maternity/NICU/Pediatrics) 261 [facility] [place] [place] [address] [place] [address] [place] [address] [address] [phone]

## 2021-04-30 NOTE — PROGRESS NOTES
Progress Note - General Surgery   Christie Thompson 38 y o  female MRN: [id number]  Unit/Bed#: -Freeman Encounter: [id number]    Assessment/Plan  Trina Hansen is a 38 y o  female     Breast abscess with cellulitis, s/p bedside I&D 4/29  AVSS, WBC 11 74 from 12 38  Cellulitis appears mildly improved based on markings, patient notes improved breast pain     Continue current care, daily wound care over breast abscess, current packed with 1/2' iodoform dressing   Continue IV antibiotics  Follow up on wound cultures to tailor antibitoics  Monitor borders of erythema   Aqua K for heat over cellulitis  Medicine primary       Subjective/Objective     Subjective: No acute events overnight     Objective:     Blood pressure 112/76, pulse 98, temperature 98 6 °F (37 °C), temperature source Oral, resp  rate 17, height 5' 2" (1 575 m), weight 99 3 kg (219 lb), SpO2 96 %  ,Body mass index is 40 06 kg/m²        Intake/Output Summary (Last 24 hours) at 4/30/2021 1024  Last data filed at 4/30/2021 0327  Gross per 24 hour   Intake 2320 ml   Output --   Net 2320 ml       Invasive Devices     Peripheral Intravenous Line            Peripheral IV 04/29/21 Left Antecubital less than 1 day                Physical Exam: /76 (BP Location: Left arm)   Pulse 98   Temp 98 6 °F (37 °C) (Oral)   Resp 17   Ht 5' 2" (1 575 m)   Wt 99 3 kg (219 lb)   SpO2 96%   BMI 40 06 kg/m²   General appearance: alert and oriented, in no acute distress  Lungs: clear to auscultation bilaterally  Heart: regular rate and rhythm, S1, S2 normal, no murmur, click, rub or gallop   Chest: Left breast with large area of cellulitic erythema along lateral border slightly improved compared to marked margins, 3cm incision still with mild amount of purulent drainage, no further palpable areas of fluctuance  Abdomen: soft, non-tender; bowel sounds normal; no masses,  no organomegaly  Extremities: extremities normal, warm and well-perfused; no cyanosis, clubbing, or edema    Lab, Imaging and other studies:I have personally reviewed pertinent lab results       VTE Pharmacologic Prophylaxis: Enoxaparin (Lovenox)  VTE Mechanical Prophylaxis: sequential compression device    Recent Results (from the past 36 hour(s))   Comprehensive metabolic panel    Collection Time: 04/29/21  1:46 PM   Result Value Ref Range    Sodium 130 (L) 136 - 145 mmol/L    Potassium 4 4 3 5 - 5 3 mmol/L    Chloride 96 (L) 100 - 108 mmol/L    CO2 26 21 - 32 mmol/L    ANION GAP 8 4 - 13 mmol/L    BUN 11 5 - 25 mg/dL    Creatinine 1 24 0 60 - 1 30 mg/dL    Glucose 475 (H) 65 - 140 mg/dL    Calcium 9 7 8 3 - 10 1 mg/dL    Corrected Calcium 10 5 (H) 8 3 - 10 1 mg/dL    AST 8 5 - 45 U/L    ALT 15 12 - 78 U/L    Alkaline Phosphatase 120 (H) 46 - 116 U/L    Total Protein 8 7 (H) 6 4 - 8 2 g/dL    Albumin 3 0 (L) 3 5 - 5 0 g/dL    Total Bilirubin 0 37 0 20 - 1 00 mg/dL    eGFR 56 ml/min/1 73sq m   CBC and differential    Collection Time: 04/29/21  1:46 PM   Result Value Ref Range    WBC 12 38 (H) 4 31 - 10 16 Thousand/uL    RBC 4 90 3 81 - 5 12 Million/uL    Hemoglobin 13 3 11 5 - 15 4 g/dL    Hematocrit 41 5 34 8 - 46 1 %    MCV 85 82 - 98 fL    MCH 27 1 26 8 - 34 3 pg    MCHC 32 0 31 4 - 37 4 g/dL    RDW 13 3 11 6 - 15 1 %    MPV 8 9 8 9 - 12 7 fL    Platelets 376 721 - 479 Thousands/uL    nRBC 0 /100 WBCs    Neutrophils Relative 80 (H) 43 - 75 %    Immat GRANS % 1 0 - 2 %    Lymphocytes Relative 14 14 - 44 %    Monocytes Relative 4 4 - 12 %    Eosinophils Relative 1 0 - 6 %    Basophils Relative 0 0 - 1 %    Neutrophils Absolute 9 94 (H) 1 85 - 7 62 Thousands/µL    Immature Grans Absolute 0 12 0 00 - 0 20 Thousand/uL    Lymphocytes Absolute 1 74 0 60 - 4 47 Thousands/µL    Monocytes Absolute 0 47 0 17 - 1 22 Thousand/µL    Eosinophils Absolute 0 08 0 00 - 0 61 Thousand/µL    Basophils Absolute 0 03 0 00 - 0 10 Thousands/µL   Hemoglobin A1C    Collection Time: 04/29/21  1:46 PM   Result Value Ref Range Hemoglobin A1C 11 8 (H) Normal 3 8-5 6%; PreDiabetic 5 7-6 4%; Diabetic >=6 5%; Glycemic control for adults with diabetes <7 0% %     mg/dl   Fingerstick Glucose (POCT)    Collection Time: 04/29/21  6:21 PM   Result Value Ref Range    POC Glucose 365 (H) 65 - 140 mg/dl   Anaerobic culture and Gram stain    Collection Time: 04/29/21  7:07 PM    Specimen: Other; Body Fluid   Result Value Ref Range    Anaerobic Culture Culture results to follow      Fingerstick Glucose (POCT)    Collection Time: 04/29/21  8:43 PM   Result Value Ref Range    POC Glucose 287 (H) 65 - 140 mg/dl   CBC (With Platelets)    Collection Time: 04/30/21  5:48 AM   Result Value Ref Range    WBC 11 74 (H) 4 31 - 10 16 Thousand/uL    RBC 4 15 3 81 - 5 12 Million/uL    Hemoglobin 11 3 (L) 11 5 - 15 4 g/dL    Hematocrit 35 2 34 8 - 46 1 %    MCV 85 82 - 98 fL    MCH 27 2 26 8 - 34 3 pg    MCHC 32 1 31 4 - 37 4 g/dL    RDW 13 3 11 6 - 15 1 %    Platelets 638 079 - 346 Thousands/uL    MPV 8 8 (L) 8 9 - 12 7 fL   Comprehensive metabolic panel    Collection Time: 04/30/21  5:48 AM   Result Value Ref Range    Sodium 131 (L) 136 - 145 mmol/L    Potassium 4 0 3 5 - 5 3 mmol/L    Chloride 101 100 - 108 mmol/L    CO2 23 21 - 32 mmol/L    ANION GAP 7 4 - 13 mmol/L    BUN 9 5 - 25 mg/dL    Creatinine 0 92 0 60 - 1 30 mg/dL    Glucose 260 (H) 65 - 140 mg/dL    Calcium 8 6 8 3 - 10 1 mg/dL    Corrected Calcium 10 0 8 3 - 10 1 mg/dL    AST 8 5 - 45 U/L    ALT 10 (L) 12 - 78 U/L    Alkaline Phosphatase 90 46 - 116 U/L    Total Protein 7 0 6 4 - 8 2 g/dL    Albumin 2 3 (L) 3 5 - 5 0 g/dL    Total Bilirubin 0 26 0 20 - 1 00 mg/dL    eGFR 80 ml/min/1 73sq m   Fingerstick Glucose (POCT)    Collection Time: 04/30/21  7:44 AM   Result Value Ref Range    POC Glucose 242 (H) 65 - 140 mg/dl

## 2021-04-30 NOTE — ASSESSMENT & PLAN NOTE
· Breast abscess in the setting of hyperglycemia/uncontrolled diabetes  · Status post I&D by surgery in the ED today     · Cultures pending  · continue Unasyn, tailor antibiotics based on culture data  · Local wound care

## 2021-04-30 NOTE — CASE MANAGEMENT
AVS :  Endocrinology appt updated     CM received call from [facility] who states pt cannot be found in [employer] portal for insurance verification  CM spoke to pt and received insurance cards, made copies and uploaded to [employer] , updtaed GRASSE referral, and placed copies also in medical records bin for scanning

## 2021-04-30 NOTE — CASE MANAGEMENT
CM received confirmation of  receipt of insurance cards by [facility] care

## 2021-04-30 NOTE — CASE MANAGEMENT
Error noted in spelling of pt's last name on insurance card  CM made [facility] aware of same  Via telephone conversation

## 2021-04-30 NOTE — PROGRESS NOTES
[address] [facility]  Progress Note - Fan Provincial 65 22 1985, 38 y o  female MRN: [id number]  Unit/Bed#: MS Sarath-Freeman Encounter: [id number]  Primary Care Provider: No primary care provider on file  Date and time admitted to hospital: 4/29/2021 12:55 PM    * Abscess of right breast  Assessment & Plan  · Breast abscess in the setting of hyperglycemia/uncontrolled diabetes  · Status post I&D by surgery in the ED today  · Cultures pending  · continue Unasyn, tailor antibiotics based on culture data  · Local wound care    Uncontrolled type 2 diabetes mellitus with hyperglycemia [facility])  Assessment & Plan  Lab Results   Component Value Date    HGBA1C 11 8 (H) 04/29/2021       Recent Labs     04/29/21  1821 04/29/21  2043 04/30/21  0744   POCGLU 365* 287* 242*       · Previously on oral medications years ago but has been lost to follow-up  · The A1c 11 8  · Started on 25 units of Levemir nightly, received last night blood sugar still 240 this morning will consider increasing to 30 units tonight   · 8 units of Humalog with meals plus sliding scale  · Case management aware will need insulin and glucometer on discharge as well as endocrinology follow-up    Hyponatremia  Assessment & Plan  · Secondary to hyperglycemia  · Continue NSS   · Improving, will continue to improve with better glycemic control    Results from last 7 days   Lab Units 04/30/21  0548 04/29/21  1346   SODIUM mmol/L 131* 130*       Morbid obesity (HCC)  Assessment & Plan  · Body mass index is 40 06 kg/m²  Dental caries  Assessment & Plan  · Very poor dentition with risk for dental abscess given hyperglycemia  · Advised follow-up with a dentist after discharge       VTE Pharmacologic Prophylaxis:   Pharmacologic: Enoxaparin (Lovenox)  Mechanical VTE Prophylaxis in Place: Yes    Patient Centered Rounds: I have performed bedside rounds with nursing staff today      Discussions with Specialists or Other Care Team Provider:  Reviewed H&P discussed case management primary RN    Education and Discussions with Family / Patient:  Discussed plan of care with patient denies any additional questions or concerns at this time    Time Spent for Care: 20 minutes  More than 50% of total time spent on counseling and coordination of care as described above  Current Length of Stay: 1 day(s)    Current Patient Status: Inpatient   Certification Statement: The patient will continue to require additional inpatient hospital stay due to IV antibiotics, wound care, wound culture follow-up, better glycemic control    Discharge Plan / Estimated Discharge Date:  48 hours pending above    Code Status: Level 1 - Full Code    Subjective:   Denies any chest pain chest tightness shortness of breath or difficulty breathing  Was evaluating the patient alongside surgery who did I and D yesterday who states that the redness is significantly improved patient reports that tenderness is improved as well  Continue to provide education regarding blood sugar control and diabetes she verbalizes understanding she does not have any medications or testing equipment home reports that she has been taking her medications in several years, states that she stopped because she does not like taking pills, continue to provide education that she will likely need to be discharged on insulin verbalizes understanding    Objective:     Vitals:   Temp (24hrs), Av 8 °F (37 1 °C), Min:98 6 °F (37 °C), Max:98 9 °F (37 2 °C)    Temp:  [98 6 °F (37 °C)-98 9 °F (37 2 °C)] 98 6 °F (37 °C)  HR:  [] 98  Resp:  [17-18] 17  BP: (109-143)/(73-98) 112/76  SpO2:  [95 %-98 %] 96 %  Body mass index is 40 06 kg/m²  Input and Output Summary (last 24 hours): Intake/Output Summary (Last 24 hours) at 2021 1032  Last data filed at 2021 0327  Gross per 24 hour   Intake 2320 ml   Output --   Net 2320 ml       Physical Exam:     Physical Exam  Vitals signs and nursing note reviewed     HENT: Head: Normocephalic  Cardiovascular:      Rate and Rhythm: Normal rate  Pulmonary:      Effort: Pulmonary effort is normal    Abdominal:      Palpations: Abdomen is soft  Musculoskeletal: Normal range of motion  Skin:     General: Skin is warm  Findings: Erythema present  Neurological:      General: No focal deficit present  Mental Status: She is alert  Mental status is at baseline  Psychiatric:         Mood and Affect: Mood normal          Thought Content: Thought content normal          Judgment: Judgment normal      Additional Data:     Labs:    Results from last 7 days   Lab Units 04/30/21  0548 04/29/21  1346   WBC Thousand/uL 11 74* 12 38*   HEMOGLOBIN g/dL 11 3* 13 3   HEMATOCRIT % 35 2 41 5   PLATELETS Thousands/uL 277 352   NEUTROS PCT %  --  80*   LYMPHS PCT %  --  14   MONOS PCT %  --  4   EOS PCT %  --  1     Results from last 7 days   Lab Units 04/30/21  0548   POTASSIUM mmol/L 4 0   CHLORIDE mmol/L 101   CO2 mmol/L 23   BUN mg/dL 9   CREATININE mg/dL 0 92   CALCIUM mg/dL 8 6   ALK PHOS U/L 90   ALT U/L 10*   AST U/L 8           * I Have Reviewed All Lab Data Listed Above  * Additional Pertinent Lab Tests Reviewed:  All [facility] Admission Reviewed    Recent Cultures (last 7 days):           Last 24 Hours Medication List:   Current Facility-Administered Medications   Medication Dose Route Frequency Provider Last Rate    acetaminophen  650 mg Oral Q6H PRN Claudia waller, DO      ampicillin-sulbactam  3 g Intravenous Q6H Jose Alejandro Radford DO 3 g (04/30/21 0604)    enoxaparin  40 mg Subcutaneous Q24H [address] Jose Alejandro Radford DO      insulin detemir  25 Units Subcutaneous HS Jose Alejandro Radford DO      insulin lispro  2-12 Units Subcutaneous 4x Daily (AC & HS) Jose Alejandro Radford DO      insulin lispro  8 Units Subcutaneous TID With Meals Jose Alejandro Radford DO      ondansetron  4 mg Intravenous Q4H PRN Claudia markhamngton, DO      oxyCODONE  5 mg Oral Q4H PRN Claudia waller, DO      sodium chloride  125 mL/hr Intravenous Continuous José Player,  mL/hr (04/30/21 9390)        Today, Patient Was Seen By: NORM Rodrigues    ** Please Note: Dragon 360 Dictation voice to text software may have been used in the creation of this document   **

## 2021-04-30 NOTE — ASSESSMENT & PLAN NOTE
· Secondary to hyperglycemia    · Continue NSS   · Improving, will continue to improve with better glycemic control    Results from last 7 days   Lab Units 04/30/21  0548 04/29/21  1346   SODIUM mmol/L 131* 130*

## 2021-04-30 NOTE — CASE MANAGEMENT
CM spoke to pt who states that she never had Endocrinology MD    CM stated that she would get one and make appointment to which pt agrees  CM called [phone],LQ Endocrinology & spoke to  [place], [place] answered  Transferred to [facility] answered Appointment for 6/17/2021 at 9 am Dr Sunita Hawk    [address] [facility]  [address] [facility] floor  C enter [facility], [address] [facility]    [phone]

## 2021-04-30 NOTE — UTILIZATION REVIEW
Inpatient Admission Authorization Request   NOTIFICATION OF INPATIENT ADMISSION/INPATIENT AUTHORIZATION REQUEST   SERVICING FACILITY:   [address] [place] [address]  Tax ID: [id number]  NPI: [id number]  Place of Service: Inpatient [address]  [address]  Place of Service Code: 24     ATTENDING PROVIDER:  Attending Name and NPI#: Rik [place], [place] [[id number]]  Address: [address] [place] [address]  Phone: [phone]     UTILIZATION REVIEW CONTACT:  Dominick Urbano Utilization   Network Utilization Review Department  Phone: [phone]  Fax [phone]  Email: [email or web address]     PHYSICIAN ADVISORY SERVICES:  FOR JGPO-KP-AMTM REVIEW - MEDICAL NECESSITY DENIAL  Phone: [phone]  Fax: [phone]  Email: [email or web address]     TYPE OF REQUEST:  Inpatient Status     ADMISSION INFORMATION:  ADMISSION DATE/TIME: 4/29/21 1606  PATIENT DIAGNOSIS CODE/DESCRIPTION:  Breast pain [N64 4]  Breast abscess [N61 1]  DISCHARGE DATE/TIME: No discharge date for patient encounter  DISCHARGE DISPOSITION (IF DISCHARGED): Final discharge disposition not confirmed     IMPORTANT INFORMATION:  Please contact the Dominick Urbano directly with any questions or concerns regarding this request  Department voicemails are confidential     Send requests for admission clinical reviews, concurrent reviews, approvals, and administrative denials due to lack of clinical to fax [phone]

## 2021-05-01 LAB
ANION GAP SERPL CALCULATED.3IONS-SCNC: 8 MMOL/L (ref 4–13)
BACTERIA SPEC ANAEROBE CULT: NORMAL
BUN SERPL-MCNC: 11 MG/DL (ref 5–25)
CALCIUM SERPL-MCNC: 9 MG/DL (ref 8.3–10.1)
CHLORIDE SERPL-SCNC: 102 MMOL/L (ref 100–108)
CO2 SERPL-SCNC: 23 MMOL/L (ref 21–32)
CREAT SERPL-MCNC: 0.95 MG/DL (ref 0.6–1.3)
ERYTHROCYTE [DISTWIDTH] IN BLOOD BY AUTOMATED COUNT: 13.4 % (ref 11.6–15.1)
GFR SERPL CREATININE-BSD FRML MDRD: 77 ML/MIN/1.73SQ M
GLUCOSE SERPL-MCNC: 200 MG/DL (ref 65–140)
GLUCOSE SERPL-MCNC: 219 MG/DL (ref 65–140)
GLUCOSE SERPL-MCNC: 244 MG/DL (ref 65–140)
GLUCOSE SERPL-MCNC: 257 MG/DL (ref 65–140)
GLUCOSE SERPL-MCNC: 321 MG/DL (ref 65–140)
HCT VFR BLD AUTO: 35.6 % (ref 34.8–46.1)
HGB BLD-MCNC: 11.4 G/DL (ref 11.5–15.4)
MCH RBC QN AUTO: 27.2 PG (ref 26.8–34.3)
MCHC RBC AUTO-ENTMCNC: 32 G/DL (ref 31.4–37.4)
MCV RBC AUTO: 85 FL (ref 82–98)
PLATELET # BLD AUTO: 281 THOUSANDS/UL (ref 149–390)
PMV BLD AUTO: 8.8 FL (ref 8.9–12.7)
POTASSIUM SERPL-SCNC: 3.8 MMOL/L (ref 3.5–5.3)
RBC # BLD AUTO: 4.19 MILLION/UL (ref 3.81–5.12)
SODIUM SERPL-SCNC: 133 MMOL/L (ref 136–145)
WBC # BLD AUTO: 10.72 THOUSAND/UL (ref 4.31–10.16)

## 2021-05-01 PROCEDURE — 82948 REAGENT STRIP/BLOOD GLUCOSE: CPT

## 2021-05-01 PROCEDURE — 99232 SBSQ HOSP IP/OBS MODERATE 35: CPT | Performed by: NURSE PRACTITIONER

## 2021-05-01 PROCEDURE — 85027 COMPLETE CBC AUTOMATED: CPT | Performed by: NURSE PRACTITIONER

## 2021-05-01 PROCEDURE — 80048 BASIC METABOLIC PNL TOTAL CA: CPT | Performed by: NURSE PRACTITIONER

## 2021-05-01 RX ADMIN — INSULIN LISPRO 8 UNITS: 100 INJECTION, SOLUTION INTRAVENOUS; SUBCUTANEOUS at 18:30

## 2021-05-01 RX ADMIN — AMPICILLIN SODIUM AND SULBACTAM SODIUM 3 G: 10; 5 INJECTION, POWDER, FOR SOLUTION INTRAVENOUS at 13:16

## 2021-05-01 RX ADMIN — INSULIN LISPRO 8 UNITS: 100 INJECTION, SOLUTION INTRAVENOUS; SUBCUTANEOUS at 21:00

## 2021-05-01 RX ADMIN — ENOXAPARIN SODIUM 40 MG: 40 INJECTION SUBCUTANEOUS at 08:47

## 2021-05-01 RX ADMIN — INSULIN LISPRO 4 UNITS: 100 INJECTION, SOLUTION INTRAVENOUS; SUBCUTANEOUS at 12:40

## 2021-05-01 RX ADMIN — AMPICILLIN SODIUM AND SULBACTAM SODIUM 3 G: 10; 5 INJECTION, POWDER, FOR SOLUTION INTRAVENOUS at 23:43

## 2021-05-01 RX ADMIN — AMPICILLIN SODIUM AND SULBACTAM SODIUM 3 G: 10; 5 INJECTION, POWDER, FOR SOLUTION INTRAVENOUS at 05:51

## 2021-05-01 RX ADMIN — INSULIN LISPRO 8 UNITS: 100 INJECTION, SOLUTION INTRAVENOUS; SUBCUTANEOUS at 12:41

## 2021-05-01 RX ADMIN — INSULIN LISPRO 8 UNITS: 100 INJECTION, SOLUTION INTRAVENOUS; SUBCUTANEOUS at 08:30

## 2021-05-01 RX ADMIN — INSULIN DETEMIR 30 UNITS: 100 INJECTION, SOLUTION SUBCUTANEOUS at 21:00

## 2021-05-01 RX ADMIN — AMPICILLIN SODIUM AND SULBACTAM SODIUM 3 G: 10; 5 INJECTION, POWDER, FOR SOLUTION INTRAVENOUS at 18:33

## 2021-05-01 RX ADMIN — INSULIN LISPRO 4 UNITS: 100 INJECTION, SOLUTION INTRAVENOUS; SUBCUTANEOUS at 18:29

## 2021-05-01 RX ADMIN — INSULIN LISPRO 4 UNITS: 100 INJECTION, SOLUTION INTRAVENOUS; SUBCUTANEOUS at 08:00

## 2021-05-01 NOTE — PLAN OF CARE
Problem: INFECTION - ADULT  Goal: Absence or prevention of progression during hospitalization  Description: INTERVENTIONS:  - Assess and monitor for signs and symptoms of infection  - Monitor lab/diagnostic results  - Monitor all insertion sites right breast wound  - Monitor endotracheal if appropriate and nasal secretions for changes in amount and color  - [place] appropriate cooling/warming therapies per order  - Administer medications as ordered  - Instruct and encourage patient and family to use good hand hygiene technique  - Identify and instruct in appropriate isolation precautions for identified infection/condition  Outcome: Progressing     Problem: SAFETY ADULT  Goal: Patient will remain free of falls  Description: INTERVENTIONS:  - Assess patient frequently for physical needs  -  Identify cognitive and physical deficits and behaviors that affect risk of falls  -  [place] fall precautions as indicated by assessment   - Educate patient/family on patient safety including physical limitations  - Instruct patient to call for assistance with activity based on assessment  - Modify environment to reduce risk of injury  - Consider OT/PT consult to assist with strengthening/mobility  Outcome: Progressing     Problem: Knowledge Deficit  Goal: Patient/family/caregiver demonstrates understanding of disease process, treatment plan, medications, and discharge instructions  Description: Complete learning assessment and assess knowledge base    Interventions:  - Provide teaching at level of understanding  - Provide teaching via preferred learning methods  Outcome: Progressing     Problem: METABOLIC, FLUID AND ELECTROLYTES - ADULT  Goal: Glucose maintained within target range  Description: INTERVENTIONS:  - Monitor Blood Glucose as ordered  - Assess for signs and symptoms of hyperglycemia and hypoglycemia  - Administer ordered medications to maintain glucose within target range  - Assess nutritional intake and initiate nutrition service referral as needed  Outcome: Progressing

## 2021-05-01 NOTE — PROGRESS NOTES
[facility]  Progress Note - Fan Provinciale 65 22 1985, 38 y o  female MRN: [id number]  Unit/Bed#: MS Clemens-Freeman Encounter: [id number]  Primary Care Provider: No primary care provider on file  Date and time admitted to hospital: 4/29/2021 12:55 PM    * Abscess of right breast  Assessment & Plan  · Breast abscess in the setting of hyperglycemia/uncontrolled diabetes  · Status post I&D by surgery in the ED 4/29  · Cultures pending  · continue Unasyn, tailor antibiotics based on culture data  · Local wound care    Uncontrolled type 2 diabetes mellitus with hyperglycemia [facility])  Assessment & Plan  Lab Results   Component Value Date    HGBA1C 11 8 (H) 04/29/2021       Recent Labs     04/30/21  1212 04/30/21  1618 04/30/21  2044 05/01/21  0747   POCGLU 243* 192* 229* 200*       · Previously on oral medications years ago but has been lost to follow-up  · The A1c 11 8  · Started on 25 units of Levemir nightly, received last night blood sugar and 200 this morning, will increasing to 30 units tonight   · 8 units of Humalog with meals plus sliding scale  · Case management aware will need insulin and glucometer on discharge as well as endocrinology follow-up    Hyponatremia  Assessment & Plan  · Secondary to hyperglycemia  · Continue NSS   · Improving, will continue to improve with better glycemic control    Results from last 7 days   Lab Units 05/01/21  0549 04/30/21  0548 04/29/21  1346   SODIUM mmol/L 133* 131* 130*       Morbid obesity (HCC)  Assessment & Plan  · Body mass index is 40 06 kg/m²  Dental caries  Assessment & Plan  · Very poor dentition with risk for dental abscess given hyperglycemia  · Advised follow-up with a dentist after discharge       VTE Pharmacologic Prophylaxis:   Pharmacologic: Enoxaparin (Lovenox)  Mechanical VTE Prophylaxis in Place: Yes    Patient Centered Rounds: I have performed bedside rounds with nursing staff today      Discussions with Specialists or Other Care Team Provider: Reviewed previous providers' notes and discussed with primary RN regarding plan of care    Education and Discussions with Family / Patient: Discussed plan of care with patient  Denies any further questions or concerns at this time  Time Spent for Care: 20 minutes  More than 50% of total time spent on counseling and coordination of care as described above  Current Length of Stay: 2 day(s)    Current Patient Status: Inpatient   Certification Statement: The patient will continue to require additional inpatient hospital stay due to IV antibiotics, pending cultures     Discharge Plan / Estimated Discharge Date: 24-48 hours, pending above       Code Status: Level 1 - Full Code      Subjective:   Patient denies any shortness of breath, chills, chest pain, or chest tightness  Patient notes breast tenderness has continued to improve today with decreased redness  Continued to provide education regarding importance of blood sugar control and outpatient follow-up  Patient verbalizes understanding  Objective:     Vitals:   Temp (24hrs), Av 9 °F (37 2 °C), Min:98 5 °F (36 9 °C), Max:99 4 °F (37 4 °C)    Temp:  [98 5 °F (36 9 °C)-99 4 °F (37 4 °C)] 98 5 °F (36 9 °C)  HR:  [] 99  Resp:  [17-18] 18  BP: (121-123)/(80-83) 122/83  SpO2:  [96 %-97 %] 97 %  Body mass index is 40 06 kg/m²  Input and Output Summary (last 24 hours): Intake/Output Summary (Last 24 hours) at 2021 1239  Last data filed at 2021 1807  Gross per 24 hour   Intake 2060 ml   Output --   Net 2060 ml       Physical Exam:     Physical Exam  Constitutional:       Appearance: Normal appearance  She is not ill-appearing  HENT:      Head: Normocephalic and atraumatic  Cardiovascular:      Rate and Rhythm: Normal rate and regular rhythm  Heart sounds: Normal heart sounds  No murmur  No friction rub  No gallop  Abdominal:      Palpations: Abdomen is soft  Tenderness: There is no abdominal tenderness  Skin:     General: Skin is warm and dry  Findings: Erythema present  Neurological:      Mental Status: She is alert  Psychiatric:         Mood and Affect: Mood normal        Additional Data:     Labs:    Results from last 7 days   Lab Units 05/01/21  0550  04/29/21  1346   WBC Thousand/uL 10 72*   < > 12 38*   HEMOGLOBIN g/dL 11 4*   < > 13 3   HEMATOCRIT % 35 6   < > 41 5   PLATELETS Thousands/uL 281   < > 352   NEUTROS PCT %  --   --  80*   LYMPHS PCT %  --   --  14   MONOS PCT %  --   --  4   EOS PCT %  --   --  1    < > = values in this interval not displayed  Results from last 7 days   Lab Units 05/01/21  0549 04/30/21  0548   POTASSIUM mmol/L 3 8 4 0   CHLORIDE mmol/L 102 101   CO2 mmol/L 23 23   BUN mg/dL 11 9   CREATININE mg/dL 0 95 0 92   CALCIUM mg/dL 9 0 8 6   ALK PHOS U/L  --  90   ALT U/L  --  10*   AST U/L  --  8           * I Have Reviewed All Lab Data Listed Above  * Additional Pertinent Lab Tests Reviewed:  All [facility] Admission Reviewed      Recent Cultures (last 7 days):     Results from last 7 days   Lab Units 04/29/21  1908   BODY FLUID CULTURE, STERILE  4+ Growth of Staphylococcus aureus*       Last 24 Hours Medication List:   Current Facility-Administered Medications   Medication Dose Route Frequency Provider Last Rate    acetaminophen  650 mg Oral Q6H PRN [place] Sinner, DO      ampicillin-sulbactam  3 g Intravenous Q6H Jose Alejandro Radford DO 3 g (05/01/21 0551)    enoxaparin  40 mg Subcutaneous Q24H [address] Jose Alejandro Radford DO      insulin detemir  30 Units Subcutaneous HS NORM Oden      insulin lispro  2-12 Units Subcutaneous 4x Daily (AC & HS) Jose Alejandro Radford DO      insulin lispro  8 Units Subcutaneous TID With Meals Jose Alejandro Radford DO      ondansetron  4 mg Intravenous Q4H PRN [place] Sinner, DO      oxyCODONE  5 mg Oral Q4H PRN Anika [place], DO          Today, Patient Was Seen By: NORM Oden    ** Please Note: Dragon 360 Dictation voice to text software may have been used in the creation of this document   **

## 2021-05-01 NOTE — ASSESSMENT & PLAN NOTE
· Secondary to hyperglycemia    · Continue NSS   · Improving, will continue to improve with better glycemic control    Results from last 7 days   Lab Units 05/01/21  0549 04/30/21  0548 04/29/21  1346   SODIUM mmol/L 133* 131* 130*

## 2021-05-01 NOTE — ASSESSMENT & PLAN NOTE
· Breast abscess in the setting of hyperglycemia/uncontrolled diabetes  · Status post I&D by surgery in the ED 4/29     · Cultures pending  · continue Unasyn, tailor antibiotics based on culture data  · Local wound care

## 2021-05-01 NOTE — CASE MANAGEMENT
NO  [facility] WILL ACCEPT PT INSURANCE  REFERRAL SENT TO [facility] AS LAST RESORT  PLEASE BEGIN TEACHING MOTHER WOUND CARE  A post acute care recommendation was made by your care team for [facility]  Discussed Freedom of Choice with patient  List of agencies given to patient via in person  patient aware the list is custom filtered for them by preference  and that [facility] post acute providers are designated

## 2021-05-01 NOTE — ASSESSMENT & PLAN NOTE
Lab Results   Component Value Date    HGBA1C 11 8 (H) 04/29/2021       Recent Labs     04/30/21  1212 04/30/21  1618 04/30/21 2044 05/01/21  0747   POCGLU 243* 192* 229* 200*       · Previously on oral medications years ago but has been lost to follow-up  · The A1c 11 8  · Started on 25 units of Levemir nightly, received last night blood sugar and 200 this morning, will increasing to 30 units tonight   · 8 units of Humalog with meals plus sliding scale  · Case management aware will need insulin and glucometer on discharge as well as endocrinology follow-up

## 2021-05-02 VITALS
WEIGHT: 219 LBS | BODY MASS INDEX: 40.3 KG/M2 | HEART RATE: 90 BPM | OXYGEN SATURATION: 98 % | HEIGHT: 62 IN | TEMPERATURE: 98.4 F | DIASTOLIC BLOOD PRESSURE: 84 MMHG | RESPIRATION RATE: 18 BRPM | SYSTOLIC BLOOD PRESSURE: 122 MMHG

## 2021-05-02 LAB
ANION GAP SERPL CALCULATED.3IONS-SCNC: 9 MMOL/L (ref 4–13)
BACTERIA SPEC BFLD CULT: ABNORMAL
BUN SERPL-MCNC: 11 MG/DL (ref 5–25)
CALCIUM SERPL-MCNC: 9.6 MG/DL (ref 8.3–10.1)
CHLORIDE SERPL-SCNC: 101 MMOL/L (ref 100–108)
CO2 SERPL-SCNC: 26 MMOL/L (ref 21–32)
CREAT SERPL-MCNC: 0.95 MG/DL (ref 0.6–1.3)
ERYTHROCYTE [DISTWIDTH] IN BLOOD BY AUTOMATED COUNT: 13.3 % (ref 11.6–15.1)
GFR SERPL CREATININE-BSD FRML MDRD: 77 ML/MIN/1.73SQ M
GLUCOSE SERPL-MCNC: 139 MG/DL (ref 65–140)
GLUCOSE SERPL-MCNC: 200 MG/DL (ref 65–140)
GLUCOSE SERPL-MCNC: 203 MG/DL (ref 65–140)
GLUCOSE SERPL-MCNC: 230 MG/DL (ref 65–140)
HCT VFR BLD AUTO: 40.8 % (ref 34.8–46.1)
HGB BLD-MCNC: 12.9 G/DL (ref 11.5–15.4)
MCH RBC QN AUTO: 26.9 PG (ref 26.8–34.3)
MCHC RBC AUTO-ENTMCNC: 31.6 G/DL (ref 31.4–37.4)
MCV RBC AUTO: 85 FL (ref 82–98)
PLATELET # BLD AUTO: 318 THOUSANDS/UL (ref 149–390)
PMV BLD AUTO: 8.9 FL (ref 8.9–12.7)
POTASSIUM SERPL-SCNC: 3.8 MMOL/L (ref 3.5–5.3)
RBC # BLD AUTO: 4.79 MILLION/UL (ref 3.81–5.12)
SODIUM SERPL-SCNC: 136 MMOL/L (ref 136–145)
WBC # BLD AUTO: 10.63 THOUSAND/UL (ref 4.31–10.16)

## 2021-05-02 PROCEDURE — 82948 REAGENT STRIP/BLOOD GLUCOSE: CPT

## 2021-05-02 PROCEDURE — 99239 HOSP IP/OBS DSCHRG MGMT >30: CPT | Performed by: NURSE PRACTITIONER

## 2021-05-02 PROCEDURE — 80048 BASIC METABOLIC PNL TOTAL CA: CPT | Performed by: NURSE PRACTITIONER

## 2021-05-02 PROCEDURE — 85027 COMPLETE CBC AUTOMATED: CPT | Performed by: NURSE PRACTITIONER

## 2021-05-02 RX ORDER — CLINDAMYCIN HYDROCHLORIDE 150 MG/1
450 CAPSULE ORAL EVERY 8 HOURS SCHEDULED
Qty: 36 CAPSULE | Refills: 0 | Status: SHIPPED | OUTPATIENT
Start: 2021-05-02 | End: 2021-05-06

## 2021-05-02 RX ADMIN — INSULIN LISPRO 4 UNITS: 100 INJECTION, SOLUTION INTRAVENOUS; SUBCUTANEOUS at 08:14

## 2021-05-02 RX ADMIN — AMPICILLIN SODIUM AND SULBACTAM SODIUM 3 G: 10; 5 INJECTION, POWDER, FOR SOLUTION INTRAVENOUS at 05:50

## 2021-05-02 RX ADMIN — INSULIN LISPRO 4 UNITS: 100 INJECTION, SOLUTION INTRAVENOUS; SUBCUTANEOUS at 17:52

## 2021-05-02 RX ADMIN — INSULIN LISPRO 8 UNITS: 100 INJECTION, SOLUTION INTRAVENOUS; SUBCUTANEOUS at 13:00

## 2021-05-02 RX ADMIN — ENOXAPARIN SODIUM 40 MG: 40 INJECTION SUBCUTANEOUS at 08:16

## 2021-05-02 RX ADMIN — INSULIN LISPRO 8 UNITS: 100 INJECTION, SOLUTION INTRAVENOUS; SUBCUTANEOUS at 17:52

## 2021-05-02 RX ADMIN — AMPICILLIN SODIUM AND SULBACTAM SODIUM 3 G: 10; 5 INJECTION, POWDER, FOR SOLUTION INTRAVENOUS at 19:15

## 2021-05-02 RX ADMIN — AMPICILLIN SODIUM AND SULBACTAM SODIUM 3 G: 10; 5 INJECTION, POWDER, FOR SOLUTION INTRAVENOUS at 11:43

## 2021-05-02 RX ADMIN — INSULIN LISPRO 8 UNITS: 100 INJECTION, SOLUTION INTRAVENOUS; SUBCUTANEOUS at 08:14

## 2021-05-02 NOTE — CASE MANAGEMENT
No accepting [place] [address] agencies  VM left for [facility]--awaiting determination  Call to traditional [place] [address]  They cannot accept as pt does not have an established PCP (merritt verified this w patient)  However, when merritt spoke to pt about a PCP she informed cm that she did not feel a home RN was neccessary as her mother is a nurse and can assist her w the teaching  MD updated

## 2021-05-02 NOTE — DISCHARGE SUMMARY
[facility] [facility]  Discharge- [facility] 65 22 1985, 38 y o  female MRN: [id number]  Unit/Bed#: [facility] Encounter: [id number]  Primary Care Provider: No primary care provider on file  Date and time admitted to hospital: 4/29/2021 12:55 PM    * Abscess of right breast  Assessment & Plan  · Breast abscess in the setting of hyperglycemia/uncontrolled diabetes  · Status post I&D by surgery in the ED 4/29  · Cultures positive for staph aureus  · will d/c on clindamycin   · Local wound care    Uncontrolled type 2 diabetes mellitus with hyperglycemia [facility])  Assessment & Plan  Lab Results   Component Value Date    HGBA1C 11 8 (H) 04/29/2021       Recent Labs     04/30/21  1212 04/30/21  1618 04/30/21  2044 05/01/21  0747   POCGLU 243* 192* 229* 200*       · Previously on oral medications years ago but has been lost to follow-up  · The A1c 11 8  · BS continue to be elevated at 200 this morning, increase Levemir to 35 units   · Increase Humalog to 10 units of Humalog with meals plus sliding scale  · Case management aware will need insulin and glucometer on discharge as well as endocrinology follow-up    Hyponatremia  Assessment & Plan  · Secondary to hyperglycemia, resolved    Results from last 7 days   Lab Units 05/02/21  0559 05/01/21  0549 04/30/21  0548 04/29/21  1346   SODIUM mmol/L 136 133* 131* 130*       Morbid obesity (HCC)  Assessment & Plan  · Body mass index is 40 06 kg/m²  Dental caries  Assessment & Plan  · Very poor dentition with risk for dental abscess given hyperglycemia  · Advised follow-up with a dentist after discharge       Discharging Physician / Practitioner: Anna Marie Houser  PCP: No primary care provider on file    Admission Date:   Admission Orders (From admission, onward)     Ordered        04/29/21 1606  Inpatient Admission  Once                   Discharge Date: 05/02/21        Consultations During Hospital Stay:  ·  IP CONSULT TO ACUTE CARE SURGERY    Procedures Performed:   · CT chest w/ contrast  · Incision and drain of right breast abscess    Significant Findings / Test Results:      CT chest with contrast   Final Result by Marbella Sequeira MD (04/29 9418)      Right breast cellulitis with superficial 7 3 cm abscess  Incidental findings of fatty liver and cholelithiasis  I personally discussed this study with Lev Bedner on 4/29/2021 at 3:00 PM                   Workstation performed: [id number]         ·   · I&D: cultures revealed 4+ growth of staphylococcus aureus     Incidental Findings:   · none    Test Results Pending at Discharge (will require follow up):   · none     Outpatient Tests Requested:  · none    Complications:  none    Reason for Admission:    Chief Complaint   Patient presents with    Breast Problem     Patient c/o right breast redness and swelling, believes she has an abscess  Hospital Course:     Samson Park is a 38 y o  female patient who originally presented to the hospital on 4/29/2021 due to right breast redness and swelling x 2-3 days  I&D for right breast abscess performed same day  Cultures obtained and patient was empirically started on Unasyn  Breast swelling, erythema, and tenderness improved during stay  Patient found to be hyponatremic in setting of hyperglycemia and given NSS with improvement  Diabetes poorly controlled at home and blood sugars remained >200 throughout admission despite insulin adjustments  Patient will be discharged with insulin and glucometer and encouraged follow-up outpatient  Cultures revealed staphylococcus aureus, patient will transition to oral clindamycin upon discharge  Please see above list of diagnoses and related plan for additional information  Condition at Discharge: stable     Discharge Day Visit / Exam:     Subjective:  Patient appears well without any complaints today   States redness is continuing to decrease and denies any swelling or tenderness  Discussed importance of diabetes management and need for outpatient follow-up  Patient verbalizes understanding  Vitals: Blood Pressure: 119/82 (05/02/21 0806)  Pulse: 91 (05/02/21 0806)  Temperature: 98 3 °F (36 8 °C) (05/02/21 0806)  Temp Source: Oral (04/30/21 0749)  Respirations: 18 (05/02/21 0806)  Height: 5' 2" (157 5 cm) (04/30/21 0809)  Weight - Scale: 99 3 kg (219 lb) (04/29/21 1247)  SpO2: 97 % (05/02/21 0806)  Exam:   Physical Exam  Constitutional:       Appearance: Normal appearance  HENT:      Head: Normocephalic and atraumatic  Cardiovascular:      Rate and Rhythm: Normal rate and regular rhythm  Pulses: Normal pulses  Heart sounds: Normal heart sounds  No murmur  No friction rub  No gallop  Pulmonary:      Effort: Pulmonary effort is normal  No respiratory distress  Breath sounds: Normal breath sounds  Abdominal:      Palpations: Abdomen is soft  Tenderness: There is no abdominal tenderness  Skin:     General: Skin is warm and dry  Findings: Erythema present  Neurological:      General: No focal deficit present  Mental Status: She is alert  Psychiatric:         Mood and Affect: Mood normal        Discussion with Family: Discussed with patient regarding plan of care  No further questions or concerns  Discharge instructions/Information to patient and family:   See after visit summary for information provided to patient and family  Provisions for Follow-Up Care:  See after visit summary for information related to follow-up care and any pertinent home health orders  Disposition:     Home    For Discharges to [facility] SNF:   · Not Applicable to this Patient - Not Applicable to this Patient    Planned Readmission: none     Discharge Statement:  I spent 45 minutes discharging the patient  This time was spent on the day of discharge  I had direct contact with the patient on the day of discharge   Greater than 50% of the total time was spent examining patient, answering all patient questions, arranging and discussing plan of care with patient as well as directly providing post-discharge instructions  Additional time then spent on discharge activities  Discharge Medications:  See after visit summary for reconciled discharge medications provided to patient and family        ** Please Note: This note has been constructed using a voice recognition system **

## 2021-05-02 NOTE — ASSESSMENT & PLAN NOTE
· Breast abscess in the setting of hyperglycemia/uncontrolled diabetes  · Status post I&D by surgery in the ED 4/29     · Cultures positive for staph aureus  · will d/c on clindamycin   · Local wound care

## 2021-05-02 NOTE — DISCHARGE INSTRUCTIONS
10% - bad control"> 10% - bad control,Hemoglobin A1c (HbA1c) greater than 10% indicating poor diabetic control,Haemoglobin A1c greater than 10% indicating poor diabetic control">   Diabetes Mellitus Type 2 in Adults, Ambulatory Care   GENERAL INFORMATION:   Diabetes mellitus type 2  is a disease that affects how your body uses glucose (sugar)  Insulin helps move sugar out of the blood so it can be used for energy  Normally, when the blood sugar level increases, the pancreas makes more insulin  Type 2 diabetes develops because either the body cannot make enough insulin, or it cannot use the insulin correctly  After many years, your pancreas may stop making insulin  Common symptoms include the following:   · More hunger or thirst than usual     · Frequent urination     · Weight loss without trying     · Blurred vision  Seek immediate care for the following symptoms:   · Severe abdominal pain, or pain that spreads to your back  You may also be vomiting  · Trouble staying awake or focusing    · Shaking or sweating    · Blurred or double vision    · Breath has a fruity, sweet smell    · Breathing is deep and labored, or rapid and shallow    · Heartbeat is fast and weak  Treatment for diabetes mellitus type 2  includes keeping your blood sugar at a normal level  You must eat the right foods, and exercise regularly  You may also need medicine if you cannot control your blood sugar level with nutrition and exercise  Manage diabetes mellitus type 2:   · Check your blood sugar level  You will be taught how to check a small drop of blood in a glucose monitor  Ask your healthcare provider when and how often to check during the day  Ask your healthcare provider what your blood sugar levels should be when you check them  · Keep track of carbohydrates (sugar and starchy foods)  Your blood sugar level can get too high if you eat too many carbohydrates   Your dietitian will help you plan meals and snacks that have the right amount of carbohydrates  · Eat low-fat foods  Some examples are skinless chicken and low-fat milk  · Eat less sodium (salt)  Some examples of high-sodium foods to limit are soy sauce, potato chips, and soup  Do not add salt to food you cook  Limit your use of table salt  · Eat high-fiber foods  Foods that are a good source of fiber include vegetables, whole grain bread, and beans  · Limit alcohol  Alcohol affects your blood sugar level and can make it harder to manage your diabetes  Women should limit alcohol to 1 drink a day  Men should limit alcohol to 2 drinks a day  A drink of alcohol is 12 ounces of beer, 5 ounces of wine, or 1½ ounces of liquor  · Get regular exercise  Exercise can help keep your blood sugar level steady, decrease your risk of heart disease, and help you lose weight  Exercise for at least 30 minutes, 5 days a week  Include muscle strengthening activities 2 days each week  Work with your healthcare provider to create an exercise plan  · Check your feet each day  for injuries or open sores  Ask your healthcare provider for activities you can do if you have an open sore  · Quit smoking  If you smoke, it is never too late to quit  Smoking can worsen the problems that may occur with diabetes  Ask your healthcare provider for information about how to stop smoking if you are having trouble quitting  · Ask about your weight:  Ask healthcare providers if you need to lose weight, and how much to lose  Ask them to help you with a weight loss program  Even a 10 to 15 pound weight loss can help you manage your blood sugar level  · Carry medical alert identification  Wear medical alert jewelry or carry a card that says you have diabetes  Ask your healthcare provider where to get these items  · Ask about vaccines  Diabetes puts you at risk of serious illness if you get the flu, pneumonia, or hepatitis   Ask your healthcare provider if you should get a flu, pneumonia, or hepatitis B vaccine, and when to get the vaccine  Follow up with your healthcare provider as directed:  Write down your questions so you remember to ask them during your visits  CARE AGREEMENT:   You have the right to help plan your care  Learn about your health condition and how it may be treated  Discuss treatment options with your caregivers to decide what care you want to receive  You always have the right to refuse treatment  The above information is an  only  It is not intended as medical advice for individual conditions or treatments  Talk to your doctor, nurse or pharmacist before following any medical regimen to see if it is safe and effective for you  © 2014 [address] is for End User's use only and may not be sold, redistributed or otherwise used for commercial purposes  All illustrations and images included in CareNotes® are the copyrighted property of A D A M , Inc  or Beau Andres  Dental Caries   AMBULATORY CARE:   Dental caries  are also called cavities  Cavities are caused by bacteria  The bacteria mix with carbohydrates from foods and create acids  The acids break down areas of enamel, which covers the outside of a tooth  This creates a small hole in the tooth called a cavity  Seek care immediately if:   · You have severe pain in your tooth or jaw  · You have swelling in your jaw or cheek  Contact your dentist if:   · You have a fever  · Your tooth pain gets worse  · You have questions or concerns about your condition or care  Symptoms of dental caries: You may not have any symptoms if your dental caries have just started to form  When dental caries reach deeper parts of your tooth, you may start to feel pain  The pain may get worse when you chew or eat hot or cold foods     Treatment for dental caries  may include any of the following:  · Fluoride treatments  may be given during dental visits, or you may use products with fluoride at home  Fluoride can be found in the form of a mouth rinse or gel  You may buy fluoride with or without a dentist's order  Your dentist will tell you what kind of fluoride to buy and how to use it  · A filling  may be placed in your tooth after the decayed portion is removed  The filling may help to protect your tooth from more decay  Prevent dental caries:   · Brush your teeth at least 2 times a day with fluoride toothpaste  · Use dental floss to clean between your teeth at least once a day  · Rinse your mouth with water or mouthwash after meals and snacks  · Chew sugarless gum after meals and snacks  · See your dentist regularly every 6 months for dental cleanings and oral exams  Follow up with your healthcare provider as directed:  Write down your questions so you remember to ask them during your visits  © Copyright [address] [place] [address] Drive Information is for End User's use only and may not be sold, redistributed or otherwise used for commercial purposes  All illustrations and images included in CareNotes® are the copyrighted property of [employer] , Inc  or [address] [place] [address]  The above information is an  only  It is not intended as medical advice for individual conditions or treatments  Talk to your doctor, nurse or pharmacist before following any medical regimen to see if it is safe and effective for you  Diabetic Foot Ulcers   WHAT YOU NEED TO KNOW:   A diabetic foot ulcer can be redness over a bony area or an open sore  The ulcer can develop anywhere on your foot or toes  Ulcers usually develop on the bottom of the foot  You may not know you have an ulcer until you notice drainage on your sock  Drainage is fluid that may be yellow, brown, or red  The fluid may also contain pus or blood  DISCHARGE INSTRUCTIONS:   Call your local emergency number (323 in the [address] [place] [address],[address]) if:   · You have a fever with chills  · You begin vomiting      · You feel faint or become confused  Call your doctor if:   · You see new drainage on your sock  · Your foot becomes red, warm, and swollen  · Your foot ulcer has a bad smell or is draining pus  · You feel pain in a foot that used to have little or no feeling  · You see black or dead tissue in or around your ulcer  · Your ulcer becomes bigger, deeper, or does not heal      · You have questions or concerns about your condition or care  Medicines:   · Antibiotics  may be given to help treat or prevent a bacterial infection  · Take your medicine as directed  Contact your healthcare provider if you think your medicine is not helping or if you have side effects  Tell him or her if you are allergic to any medicine  Keep a list of the medicines, vitamins, and herbs you take  Include the amounts, and when and why you take them  Bring the list or the pill bottles to follow-up visits  Carry your medicine list with you in case of an emergency  Care for your wound as directed:  A bandage will be put on your ulcer  Your healthcare provider will give you instructions on changing your bandage  You may need to clean the wound and change the bandage daily  The bandage may contain medicines to help your ulcer heal  You may be asked to put medicine on your foot ulcer before putting on the bandage  The medicine may also prevent growth of tissue that is not healthy  You may need to cover your wound with a plastic bag while you bathe  Ask your healthcare provider for instructions on bathing until your foot heals  Prevent diabetic foot ulcers:  Good foot care may help prevent ulcers, or keep them from getting worse  Ask someone to help you if you are not able to check your feet by yourself  You or another person may need to do any of the following:  · Keep your blood sugar levels under control  Continue the plan for your diabetes that you and your healthcare provider have discussed   Healthy food choices and taking your medicines as directed may help control blood sugars  Contact your healthcare provider if your blood sugar levels are higher than directed  · Wash your feet each day with soap and warm water  Do not use hot water, because this can injure your foot  Dry your feet gently with a towel after you wash them  Dry between and under your toes  · Apply lotion or a moisturizer on your dry feet  Ask your healthcare provider what lotions are best to use  Do not put lotion or moisturizer between your toes  Moisture between your toes could lead to skin breakdown  · Check your feet each day  Look at your whole foot, including the bottom, and between and under your toes  Check for wounds, corns, and calluses  Feel your feet by running your hands along the tops, bottoms, sides, and between your toes  Use a nonbreakable mirror to check your feet if you have trouble seeing the bottoms  Do not try to remove corns or calluses yourself  File or cut your toenails straight across  · Protect your feet  Do not walk barefoot or wear your shoes without socks  Check your shoes for rocks or other objects that can hurt your feet  Wear cotton socks to help keep your feet dry  Wear socks without toe seams, or wear them with the seams inside out  Change your socks each day  Do not wear socks that are dirty or damp  · Wear shoes that fit well  Wear shoes that do not rub against any area of your feet  Your shoes should be ½ to ¾ inch (1 to 2 centimeters) longer than your feet  Your shoes should also have extra space around the widest part of your feet  Walking or athletic shoes with laces or straps that adjust are best  Ask your healthcare provider for help to choose shoes that fit you best  Ask him or her if you need to wear an insert, orthotic, or bandage on your feet  · Do not smoke  Nicotine can cause damage to your blood vessels and increases your risk for foot ulcers   Do not use e-cigarettes or smokeless tobacco in place of cigarettes or to help you quit  They still contain nicotine  Ask your healthcare provider for information if you currently smoke and need help quitting  · Know the risks if you choose to drink alcohol  Alcohol can cause your blood sugar levels to be low if you use insulin  Alcohol can cause high blood sugar levels and weight gain if you drink too much  A drink of alcohol is 12 ounces of beer, 5 ounces of wine, or 1½ ounces of liquor  · Maintain a healthy weight  Ask your healthcare provider how much you should weigh  A healthy weight can help you control your diabetes  Ask him or her to help you create a weight loss plan if you are overweight  Even a 10 to 15 pound weight loss can help you manage your blood sugar level  Follow up with your healthcare provider or foot specialist as directed: You may need to return often to have your wound checked  Your wound may be measured to see if it is getting smaller  Bring any offloading devices or footwear to your follow-up visits so your healthcare provider or specialist can check them  Write down your questions so you remember to ask them during your visits  © Copyright [address] Information is for End User's use only and may not be sold, redistributed or otherwise used for commercial purposes  All illustrations and images included in CareNotes® are the copyrighted property of [employer]  or [facility] Tamiko Machado   The above information is an  only  It is not intended as medical advice for individual conditions or treatments  Talk to your doctor, nurse or pharmacist before following any medical regimen to see if it is safe and effective for you

## 2021-05-02 NOTE — ASSESSMENT & PLAN NOTE
Lab Results   Component Value Date    HGBA1C 11 8 (H) 04/29/2021       Recent Labs     04/30/21  1212 04/30/21  1618 04/30/21 2044 05/01/21  0747   POCGLU 243* 192* 229* 200*       · Previously on oral medications years ago but has been lost to follow-up  · The A1c 11 8  · BS continue to be elevated at 200 this morning, increase Levemir to 35 units   · Increase Humalog to 10 units of Humalog with meals plus sliding scale  · Case management aware will need insulin and glucometer on discharge as well as endocrinology follow-up

## 2021-05-02 NOTE — DISCHARGE INSTR - OTHER ORDERS
Wound care: Flush wound on right breast with normal saline daily  Pat dry  Apply a 4 x 4 gauze and cover with and ABD pad  Apply tape to keep in place

## 2021-05-02 NOTE — CASE MANAGEMENT
Pt has daily dressing change for abscess  Cm spoke to pt and her mother can assist w dressing  Pt would like home RN for diabetic teaching  All agencies refusing  Still awaiting answer from [address] [place] [address] and traditional  Rn instructed to do diabetic teachign w pt and her mother   Cm to follow

## 2021-05-02 NOTE — DISCHARGE INSTR - AVS FIRST PAGE
Thank you for choosing [facility] for year care, please take all prescriptions as instructed, please make appropriate follow-up visits

## 2021-05-02 NOTE — ASSESSMENT & PLAN NOTE
· Secondary to hyperglycemia, resolved    Results from last 7 days   Lab Units 05/02/21  0559 05/01/21  0549 04/30/21  0548 04/29/21  1346   SODIUM mmol/L 136 133* 131* 130*

## 2021-05-04 NOTE — UTILIZATION REVIEW
Notification of Discharge   This is a Notification of Discharge from our facility [address]  Please be advised that this patient has been discharge from our facility  Below you will find the admission and discharge date and time including the patients disposition  UTILIZATION REVIEW CONTACT:  Marta Diez  Utilization   Network Utilization Review Department  Phone: [phone] x carefully listen to the prompts  All voicemails are confidential   Email: [email or web address]     PHYSICIAN ADVISORY SERVICES:  FOR [id number] REVIEW - MEDICAL NECESSITY DENIAL  Phone: [phone]  Fax: [phone]  Email: [email or web address]     PRESENTATION DATE: 4/29/2021 12:55 PM  OBERVATION ADMISSION DATE:   INPATIENT ADMISSION DATE: 4/29/21 1606   DISCHARGE DATE: 5/2/2021 10:00 PM  DISPOSITION: Home/Self Care Home/Self Care      IMPORTANT INFORMATION:  Send all requests for admission clinical reviews, approved or denied determinations and any other requests to dedicated fax number below belonging to the campus where the patient is receiving treatment   List of dedicated fax numbers:  [address] DENIALS (Administrative/Medical Necessity) [phone]   [address] (Maternity/NICU/Pediatrics) [phone]   Daniel Del Toro [phone]   Rob Yap [phone]   Mick Riley [phone]   Deneen Santos [facility] [address] [facility] [phone]   [facility]  [phone]   [address] [place] [address], [address]  [address] [facility] [address] [facility] [phone]

## 2021-05-19 ENCOUNTER — TELEPHONE (OUTPATIENT)
Dept: INTERNAL MEDICINE CLINIC | Facility: CLINIC | Age: 36
End: 2021-05-19

## 2021-05-19 NOTE — TELEPHONE ENCOUNTER
Brinda Escudero / Lavern Burns    She said pt requested a glucometer    but is not the preferred brand    her ins covers one touch glucometer and supplies    RX to be sent to Pt's pharmacy  Pt will be establishing  with Dr Justine Sheth this afternoon

## 2021-06-14 ENCOUNTER — TELEPHONE (OUTPATIENT)
Dept: ENDOCRINOLOGY | Facility: CLINIC | Age: 36
End: 2021-06-14

## 2021-10-02 ENCOUNTER — HOSPITAL ENCOUNTER (EMERGENCY)
Facility: HOSPITAL | Age: 36
Discharge: HOME | End: 2021-10-02
Attending: EMERGENCY MEDICINE
Payer: COMMERCIAL

## 2021-10-02 VITALS
DIASTOLIC BLOOD PRESSURE: 73 MMHG | HEART RATE: 74 BPM | TEMPERATURE: 98 F | RESPIRATION RATE: 18 BRPM | SYSTOLIC BLOOD PRESSURE: 134 MMHG | OXYGEN SATURATION: 98 %

## 2021-10-02 DIAGNOSIS — R73.9 HYPERGLYCEMIA: Primary | ICD-10-CM

## 2021-10-02 LAB
ALBUMIN SERPL BCP-MCNC: 3.4 G/DL (ref 3.5–5)
ALP SERPL-CCNC: 131 U/L (ref 46–116)
ALT SERPL W P-5'-P-CCNC: 24 U/L (ref 12–78)
ANION GAP SERPL CALCULATED.3IONS-SCNC: 9 MMOL/L (ref 4–13)
AST SERPL W P-5'-P-CCNC: 13 U/L (ref 5–45)
BACTERIA UR QL AUTO: NORMAL /HPF
BASE EX.OXY STD BLDV CALC-SCNC: 68.6 % (ref 60–80)
BASE EXCESS BLDV CALC-SCNC: -0.4 MMOL/L
BASOPHILS # BLD AUTO: 0.03 THOUSANDS/ΜL (ref 0–0.1)
BASOPHILS NFR BLD AUTO: 0 % (ref 0–1)
BETA-HYDROXYBUTYRATE: 0.1 MMOL/L
BILIRUB SERPL-MCNC: 0.23 MG/DL (ref 0.2–1)
BILIRUB UR QL STRIP: NEGATIVE
BUN SERPL-MCNC: 13 MG/DL (ref 5–25)
CALCIUM ALBUM COR SERPL-MCNC: 10.3 MG/DL (ref 8.3–10.1)
CALCIUM SERPL-MCNC: 9.8 MG/DL (ref 8.3–10.1)
CHLORIDE SERPL-SCNC: 97 MMOL/L (ref 100–108)
CLARITY UR: CLEAR
CO2 SERPL-SCNC: 26 MMOL/L (ref 21–32)
COLOR UR: YELLOW
CREAT SERPL-MCNC: 1.32 MG/DL (ref 0.6–1.3)
EOSINOPHIL # BLD AUTO: 0.1 THOUSAND/ΜL (ref 0–0.61)
EOSINOPHIL NFR BLD AUTO: 1 % (ref 0–6)
ERYTHROCYTE [DISTWIDTH] IN BLOOD BY AUTOMATED COUNT: 13.9 % (ref 11.6–15.1)
GFR SERPL CREATININE-BSD FRML MDRD: 51 ML/MIN/1.73SQ M
GLUCOSE SERPL-MCNC: 204 MG/DL (ref 65–140)
GLUCOSE SERPL-MCNC: 409 MG/DL (ref 65–140)
GLUCOSE SERPL-MCNC: >500 MG/DL (ref 65–140)
GLUCOSE UR STRIP-MCNC: ABNORMAL MG/DL
HCO3 BLDV-SCNC: 26 MMOL/L (ref 24–30)
HCT VFR BLD AUTO: 42.8 % (ref 34.8–46.1)
HGB BLD-MCNC: 13.9 G/DL (ref 11.5–15.4)
HGB UR QL STRIP.AUTO: NEGATIVE
IMM GRANULOCYTES # BLD AUTO: 0.05 THOUSAND/UL (ref 0–0.2)
IMM GRANULOCYTES NFR BLD AUTO: 1 % (ref 0–2)
KETONES UR STRIP-MCNC: NEGATIVE MG/DL
LEUKOCYTE ESTERASE UR QL STRIP: ABNORMAL
LYMPHOCYTES # BLD AUTO: 2.12 THOUSANDS/ΜL (ref 0.6–4.47)
LYMPHOCYTES NFR BLD AUTO: 24 % (ref 14–44)
MCH RBC QN AUTO: 27.6 PG (ref 26.8–34.3)
MCHC RBC AUTO-ENTMCNC: 32.5 G/DL (ref 31.4–37.4)
MCV RBC AUTO: 85 FL (ref 82–98)
MONOCYTES # BLD AUTO: 0.36 THOUSAND/ΜL (ref 0.17–1.22)
MONOCYTES NFR BLD AUTO: 4 % (ref 4–12)
NEUTROPHILS # BLD AUTO: 6.34 THOUSANDS/ΜL (ref 1.85–7.62)
NEUTS SEG NFR BLD AUTO: 70 % (ref 43–75)
NITRITE UR QL STRIP: NEGATIVE
NON-SQ EPI CELLS URNS QL MICRO: NORMAL /HPF
NRBC BLD AUTO-RTO: 0 /100 WBCS
O2 CT BLDV-SCNC: 14.4 ML/DL
PCO2 BLDV: 49 MM HG (ref 42–50)
PH BLDV: 7.34 [PH] (ref 7.3–7.4)
PH UR STRIP.AUTO: 6 [PH]
PLATELET # BLD AUTO: 275 THOUSANDS/UL (ref 149–390)
PMV BLD AUTO: 9.6 FL (ref 8.9–12.7)
PO2 BLDV: 38.8 MM HG (ref 35–45)
POTASSIUM SERPL-SCNC: 4.4 MMOL/L (ref 3.5–5.3)
PROT SERPL-MCNC: 8.5 G/DL (ref 6.4–8.2)
PROT UR STRIP-MCNC: NEGATIVE MG/DL
RBC # BLD AUTO: 5.04 MILLION/UL (ref 3.81–5.12)
RBC #/AREA URNS AUTO: NORMAL /HPF
SODIUM SERPL-SCNC: 132 MMOL/L (ref 136–145)
SP GR UR STRIP.AUTO: 1.01 (ref 1–1.03)
UROBILINOGEN UR QL STRIP.AUTO: 0.2 E.U./DL
WBC # BLD AUTO: 9 THOUSAND/UL (ref 4.31–10.16)
WBC #/AREA URNS AUTO: NORMAL /HPF

## 2021-10-02 PROCEDURE — 81001 URINALYSIS AUTO W/SCOPE: CPT | Performed by: PHYSICIAN ASSISTANT

## 2021-10-02 PROCEDURE — 99284 EMERGENCY DEPT VISIT MOD MDM: CPT | Performed by: PHYSICIAN ASSISTANT

## 2021-10-02 PROCEDURE — 85025 COMPLETE CBC W/AUTO DIFF WBC: CPT | Performed by: PHYSICIAN ASSISTANT

## 2021-10-02 PROCEDURE — 99283 EMERGENCY DEPT VISIT LOW MDM: CPT

## 2021-10-02 PROCEDURE — 80053 COMPREHEN METABOLIC PANEL: CPT | Performed by: PHYSICIAN ASSISTANT

## 2021-10-02 PROCEDURE — 82010 KETONE BODYS QUAN: CPT | Performed by: PHYSICIAN ASSISTANT

## 2021-10-02 PROCEDURE — 82948 REAGENT STRIP/BLOOD GLUCOSE: CPT

## 2021-10-02 PROCEDURE — 82805 BLOOD GASES W/O2 SATURATION: CPT | Performed by: PHYSICIAN ASSISTANT

## 2021-10-02 PROCEDURE — 96374 THER/PROPH/DIAG INJ IV PUSH: CPT

## 2021-10-02 PROCEDURE — 36415 COLL VENOUS BLD VENIPUNCTURE: CPT | Performed by: PHYSICIAN ASSISTANT

## 2021-10-02 PROCEDURE — 96361 HYDRATE IV INFUSION ADD-ON: CPT

## 2021-10-02 RX ADMIN — SODIUM CHLORIDE 1000 ML: 0.9 INJECTION, SOLUTION INTRAVENOUS at 19:42

## 2021-10-02 RX ADMIN — INSULIN HUMAN 10 UNITS: 100 INJECTION, SOLUTION PARENTERAL at 20:14
